# Patient Record
Sex: FEMALE | Race: WHITE | Employment: FULL TIME | URBAN - METROPOLITAN AREA
[De-identification: names, ages, dates, MRNs, and addresses within clinical notes are randomized per-mention and may not be internally consistent; named-entity substitution may affect disease eponyms.]

---

## 2017-01-16 ENCOUNTER — PRE VISIT (OUTPATIENT)
Dept: ORTHOPEDICS | Facility: CLINIC | Age: 47
End: 2017-01-16

## 2017-01-18 ENCOUNTER — OFFICE VISIT (OUTPATIENT)
Dept: ORTHOPEDICS | Facility: CLINIC | Age: 47
End: 2017-01-18

## 2017-01-18 VITALS — HEIGHT: 67 IN | BODY MASS INDEX: 26.37 KG/M2 | WEIGHT: 168 LBS

## 2017-01-18 DIAGNOSIS — M25.562 LEFT KNEE PAIN: Primary | ICD-10-CM

## 2017-01-18 DIAGNOSIS — M25.551 HIP PAIN, RIGHT: ICD-10-CM

## 2017-01-18 DIAGNOSIS — M17.32 POST-TRAUMATIC OSTEOARTHRITIS OF LEFT KNEE: Primary | ICD-10-CM

## 2017-01-18 RX ORDER — PAROXETINE 10 MG/1
TABLET, FILM COATED ORAL
COMMUNITY

## 2017-01-18 ASSESSMENT — ENCOUNTER SYMPTOMS
JOINT SWELLING: 1
ARTHRALGIAS: 1
NECK PAIN: 1
MYALGIAS: 0
MUSCLE WEAKNESS: 0
BACK PAIN: 0
MUSCLE CRAMPS: 0
STIFFNESS: 1

## 2017-01-18 ASSESSMENT — KOOS JR
STANDING UPRIGHT: 2
KOOS JR SCORING: 52.47
GOING UP OR DOWN STAIRS: 4
STRAIGHTENING KNEE FULLY: 2
WHAT AMOUNT OF KNEE PAIN HAVE YOU EXPERIENCED THE LAST WEEK DURING THE FOLLOWING ACTIVITIES: 1
BENDING TO THE FLOOR TO PICK UP OBJECT: 3
TWISING OR PIVOTING ON KNEE: 4
RISING FROM SITTING: 3
HOW SEVERE IS YOUR KNEE STIFFNESS AFTER FIRST WAKING IN MORNING: 1
HOW SEVERE IS YOUR KNEE STIFFNESS AFTER FIRST WAKING IN MORNING: 3
THE FOLLOWING QUESTIONS CONCERN YOUR PHYSICAL FUNCTION. BY THIS WE MEAN YOUR ABILITY TO MOVE AROUND AND TO LOOK AFTER YOURSELF. FOR EACH OF THE FOLLOWING ACTIVITIES PLEASE INDICATE THE DEGREE OF DIFFICULTY YOU HAVE EXPERIENCED IN THE LAST WEEK DUE TO YOUR KNEE.: 1

## 2017-01-18 NOTE — PROGRESS NOTES
[facility], Orthopaedic Surgery, Arthritis, Hip and Knee Replacement    Mariaan Royal MRN# [id number]   Age: 46 year old YOB: 1970     Requesting physician: Nathan Kelley*              History of Present Illness:   Mariana Royal is a 46 year old year old female who presents today for evaluation and management of right hip pain and left knee pain.    Right hip:   This pain started about 1 week ago.  The pain localizes to the lateral aspect of the hip.  She denies trauma.  The pain is present with ambulation.  She does not have pain with sitting, standing, or sleeping.  She does not have pain when walking backwards.  At this point she is using crutches for the bilateral leg pain.  She has tried ibuprofen which helps some.  No increased activity prior to the right hip pain.      Left knee:   She has had left knee pain for about 1 year.  The pain started around last Spring.  She was seen by Dr. Stewart and subsequently underwent left knee arthroscopy in 6/2017 with planned cartilage repair with ACI.  Intraoperatively it was found that she was not a good candidate as the wear was worse than they had suspected.  At that surgery the meniscus and cartilage was debrided.  She has not had any injections.  She did injection on the right knee prior to the UKA in 2009 and did not have much relief.    The right UKA was done in 2009 and she has had a very good result from that surgery.      She works as an elementary .           Past Medical History:   There is no problem list on file for this patient.    Past Medical History   Diagnosis Date     Arthritis      Prior history of blood clot: none  Prior history of bleeding problems: none  Prior history of anesthetic complications: none  Currently on opioids:  none  History of Diabetes: no           Past Surgical History:     Past Surgical History   Procedure Laterality Date     Knee surgery       right x 5     Appendectomy  "      Mouth surgery       Arthroscopy knee Left 6/7/2016     Procedure: ARTHROSCOPY KNEE;  Surgeon: Nathan Stewart MD;  Location:  OR            Social History:     Social History     Social History     Marital Status:      Spouse Name: N/A     Number of Children: N/A     Years of Education: N/A     Occupational History     Not on file.     Social History Main Topics     Smoking status: Never Smoker      Smokeless tobacco: Not on file     Alcohol Use: Yes      Comment: social     Drug Use: No     Sexual Activity: Not on file     Other Topics Concern     Not on file     Social History Narrative     From [place].  Non smoker, elementary .           Family History:       Family History   Problem Relation Age of Onset     Other Cancer Mother      Hypertension Father      Other Cancer Father             Medications:     Current Outpatient Prescriptions   Medication Sig     PARoxetine (PAXIL) 10 MG tablet      omeprazole (PRILOSEC) 20 MG capsule daily      traZODone (DESYREL) 50 MG tablet 50 mg At Bedtime      Probiotic Product (PROBIOTIC DAILY PO) Take by mouth daily      UNABLE TO FIND MEDICATION NAME: Bio Cleanse - OTC     No current facility-administered medications for this visit.                Review of Systems:   A comprehensive 10 point review of systems (constitutional, ENT, cardiac, peripheral vascular, lymphatic, respiratory, GI, , Musculoskeletal, skin, Neurological) was performed and documented in the intake form and at the end of this note.           Physical Exam:     EXAMINATION pertinent findings:   VITAL SIGNS: Height 1.702 m (5' 7\"), weight 76.204 kg (168 lb).  Body mass index is 26.31 kg/(m^2).  GEN: AOx3, appears stated age, cooperative, no distress  HEENT: normocephalic, atraumatic  RESP: non labored breathing   ABD: benign   SKIN: No rashes or open lesions   CV: Non-tachycardic   NEURO: CN2-12 grossly intact   VASCULAR: 2+ DP pulse   MUSCULOSKELETAL:   Gait: " patient walks with a normal gait.   They do achieve full extension at the knee.    Left painful  Knee  Overall limb alignment is: Varus  Effusion or swelling of the knee: none  Tenderness to palpation: yes, pain localizes to the medial joint line  ROM: 0 to 135  Pain with knee ROM: none  Extensor lag: none  MCL stability: Stable corretable to neutral   Lateral Stability: Stable  Lachman: stable  Posterior stability: stable  Pain with passive full hip range of motion: none  Prior surgical incision: none      right painful  Hip  Trendelenberg sign: Negative  Pain with log roll: Negative  Pain with active straight leg raise: Negative  Tenderness to palpation over the bursa: Negative  Tenderness to palpation along the IT band: Negative  pain free passive straight leg raise  ROM  Extension 0, flexion 100, IR 30, ER 50, Abduction 40, Adduction 20  Prior surgical incision: none    No tenderness to palpation of the knee, no pain with knee range of motion.      Motor: normal ehl/fhl/gs/at strength  Sensory: normal sensation in DP/SP/T/S/S distributions  Vascular: 2+ PT pulse           Data:   Imaging:   Plain films show no hip OA, unremarkable pelvis xray.  Alignment films show mild varus alignment and medial compartment OA.           Assessment and Plan:   Assessment:  Ms. Royal is a very pleasant 46-year-old woman who is status post right unicompartmental knee arthroplasty in 2009.  She has had an excellent result from that surgery.      She presents today for evaluation of right hip and left knee pain.  With regards to the right hip, her current symptoms have been going on for about 1 week, and are consistent with trochanteric bursitis.  There are no signs or symptoms consistent with osteoarthritis or other concerning findings.  At this point, I recommended physical therapy for hip abductor strengthening, as well as local modalities.  She was given a referral to physical therapy.      With regards to her left knee, she  has known unicompartmental knee osteoarthritis, status post knee arthroscopy by Dr. Stewart in June.  We discussed operative and non-operative treatment options including corticosteroid injections, medications, and offloading knee brace.  At this point, her symptoms seem to be worsening.  Radiographically, she does not have quite bone-on-bone arthritis, but is very close, and based on Dr. Stewart's arthroscopic notes, it does seem like her arthritis is likely bone-on-bone at some sites.  She was interested in a corticosteroid injection today.  Please see procedure note.  She tolerated this well.  She was also given a referral for an offloading knee brace, which I think would greatly help with her symptoms.      She will return to clinic if she wishes for further treatment, including injections versus further discussion of surgical treatment options.           PROCEDURE DATE: 1/18/2017     PROCEDURE:  We discussed the risks and benefits of injection with the patient.  Informed verbal and writtten consent was obtained.  The patient's left knee was prepped in the normal sterile fashion.   The knee was injected with 9 cc of Lidocaine and 1 cc of Kenalog (40 mg/ml).       The patient tolerated the procedure well.        Israel Vogt M.D.     Arthritis and Joint Replacement  Department of Orthopaedic Surgery, [facility]  [email or web address]  [phone] (pager)          Answers for HPI/ROS submitted by the patient on 1/18/2017   General Symptoms: No  Skin Symptoms: No  HENT Symptoms: No  EYE SYMPTOMS: No  HEART SYMPTOMS: No  LUNG SYMPTOMS: No  INTESTINAL SYMPTOMS: No  URINARY SYMPTOMS: No  GYNECOLOGIC SYMPTOMS: No  BREAST SYMPTOMS: No  SKELETAL SYMPTOMS: Yes  BLOOD SYMPTOMS: No  NERVOUS SYSTEM SYMPTOMS: No  MENTAL HEALTH SYMPTOMS: No  Back pain: No  Muscle aches: No  Neck pain: Yes  Swollen joints: Yes  Joint pain: Yes  Bone pain: No  Muscle cramps: No  Muscle weakness: No  Joint  stiffness: Yes  Bone fracture: No

## 2017-01-18 NOTE — NURSING NOTE
"Reason For Visit:   Chief Complaint   Patient presents with     Consult     Left knee pain and right hip pain. DOS on Left knee cleaned meniscus 6/7/16 By Dr. Stewart       Primary MD: Lauren Yip Np  Ref. MD: Dr. stewart    ?  No    Date of injury: No  Date of surgery: 6/7/16  Type of surgery: Examination Under Anesthesia Left Knee, Left Knee Arthroscopy, Chondroplasty, Partial Meniscectomy  Smoker: No  Request smoking cessation information: No    Ht 1.702 m (5' 7\")  Wt 76.204 kg (168 lb)  BMI 26.31 kg/m2    Pain Assessment  Patient Currently in Pain: No (In pain if she does not use crutches)      Current Outpatient Prescriptions   Medication     PARoxetine (PAXIL) 10 MG tablet     omeprazole (PRILOSEC) 20 MG capsule     traZODone (DESYREL) 50 MG tablet     Probiotic Product (PROBIOTIC DAILY PO)     UNABLE TO FIND     No current facility-administered medications for this visit.        No Known Allergies    "

## 2017-01-18 NOTE — NURSING NOTE
[facility]  [address] [place] [id number]  Dept: [phone]  ______________________________________________________________________________    Patient: Mariana Royal   : 1970   MRN: [id number]   2017    INVASIVE PROCEDURE SAFETY CHECKLIST    Date: 2017   Procedure: Left knee steroid injection for left knee pain.   Patient Name: Mariana Royal  MRN: [id number]  YOB: 1970    Action: Complete sections as appropriate. Any discrepancy results in a HARD COPY until resolved.     PRE PROCEDURE:  Patient ID verified with 2 identifiers (name and  or MRN): Yes  Procedure and site verified with patient/designee (when able): Yes  Accurate consent documentation in medical record: Yes  H&P (or appropriate assessment) documented in medical record: Yes  H&P must be up to 20 days prior to procedure and updates within 24 hours of procedure as applicable: Yes  Relevant diagnostic and radiology test results appropriately labeled and displayed as applicable: Yes  Procedure site(s) marked with provider initials: Yes    TIMEOUT:  Time-Out performed immediately prior to starting procedure, including verbal and active participation of all team members addressing the following:Yes  * Correct patient identify  * Confirmed that the correct side and site are marked  * An accurate procedure consent form  * Agreement on the procedure to be done  * Correct patient position  * Relevant images and results are properly labeled and appropriately displayed  * The need to administer antibiotics or fluids for irrigation purposes during the procedure as applicable   * Safety precautions based on patient history or medication use    DURING PROCEDURE: Verification of correct person, site, and procedures any time the responsibility for care of the patient is transferred to another member of the care team.

## 2017-05-03 ENCOUNTER — OFFICE VISIT (OUTPATIENT)
Dept: ORTHOPEDICS | Facility: CLINIC | Age: 47
End: 2017-05-03

## 2017-05-03 VITALS — BODY MASS INDEX: 25.9 KG/M2 | WEIGHT: 165 LBS | HEIGHT: 67 IN

## 2017-05-03 DIAGNOSIS — M17.12 PRIMARY OSTEOARTHRITIS OF LEFT KNEE: Primary | ICD-10-CM

## 2017-05-03 NOTE — NURSING NOTE
[facility]  [address] [place] [id number]  Dept: [phone]  ______________________________________________________________________________    Patient: Mariana Royal   : 1970   MRN: [id number]   May 3, 2017    INVASIVE PROCEDURE SAFETY CHECKLIST    Date: 5/3/2017   Procedure: Left knee steroid injection for pain  Patient Name: Mariana Royal  MRN: [id number]  YOB: 1970    Action: Complete sections as appropriate. Any discrepancy results in a HARD COPY until resolved.     PRE PROCEDURE:  Patient ID verified with 2 identifiers (name and  or MRN): Yes  Procedure and site verified with patient/designee (when able): Yes  Accurate consent documentation in medical record: Yes  H&P (or appropriate assessment) documented in medical record: Yes  H&P must be up to 20 days prior to procedure and updates within 24 hours of procedure as applicable: Yes  Relevant diagnostic and radiology test results appropriately labeled and displayed as applicable: Yes  Procedure site(s) marked with provider initials: Yes    TIMEOUT:  Time-Out performed immediately prior to starting procedure, including verbal and active participation of all team members addressing the following:Yes  * Correct patient identify  * Confirmed that the correct side and site are marked  * An accurate procedure consent form  * Agreement on the procedure to be done  * Correct patient position  * Relevant images and results are properly labeled and appropriately displayed  * The need to administer antibiotics or fluids for irrigation purposes during the procedure as applicable   * Safety precautions based on patient history or medication use    DURING PROCEDURE: Verification of correct person, site, and procedures any time the responsibility for care of the patient is transferred to another member of the care team.

## 2017-05-03 NOTE — PROGRESS NOTES
[facility] Physicians, Orthopaedic Surgery, Arthritis, Hip and Knee Replacement    Mariana Royal MRN# [id number]   Age: 46 year old YOB: 1970     Requesting physician: Nathan Kelley*              History of Present Illness:   Mariana is a very pleasant 46-year-old woman who is status post a left unicompartmental knee arthroplasty that is functioning very well.  She returns today for evaluation and management of her mild to moderate right knee osteoarthritis.  I had seen her about 4 months ago.  At that point, she had a right knee cortisone injection.  She notes that she had excellent relief of symptoms for at least 4 months.  Over the past few weeks, she noted an acutely increased left knee pain.  The knee pain is severe and debilitating.  It affects her on a daily basis.  It is worse with weightbearing activity.  She describes it as a sharp, shooting pain on the medial aspect of her knee.  She has had intermittent swelling.  She is interested in a repeat injection, given the excellent relief of her symptoms following the last injection.                Past Medical History:   There is no problem list on file for this patient.    Past Medical History:   Diagnosis Date     Arthritis      Prior history of blood clot: none  Prior history of bleeding problems: none  Prior history of anesthetic complications: none  Currently on opioids:  none  History of Diabetes: no           Past Surgical History:     Past Surgical History:   Procedure Laterality Date     APPENDECTOMY       ARTHROSCOPY KNEE Left 6/7/2016    Procedure: ARTHROSCOPY KNEE;  Surgeon: Nathan Stewart MD;  Location:  OR     KNEE SURGERY      right x 5     MOUTH SURGERY              Social History:     Social History     Social History     Marital status:      Spouse name: N/A     Number of children: N/A     Years of education: N/A     Occupational History     Not on file.     Social History Main Topics      "Smoking status: Never Smoker     Smokeless tobacco: Not on file     Alcohol use Yes      Comment: social     Drug use: No     Sexual activity: Not on file     Other Topics Concern     Not on file     Social History Narrative     From [place].  Non smoker, elementary .           Family History:       Family History   Problem Relation Age of Onset     Other Cancer Mother      Hypertension Father      Other Cancer Father             Medications:     Current Outpatient Prescriptions   Medication Sig     PARoxetine (PAXIL) 10 MG tablet      omeprazole (PRILOSEC) 20 MG capsule daily      traZODone (DESYREL) 50 MG tablet 50 mg At Bedtime      Probiotic Product (PROBIOTIC DAILY PO) Take by mouth daily      UNABLE TO FIND MEDICATION NAME: Bio Cleanse - OTC     No current facility-administered medications for this visit.               Physical Exam:     EXAMINATION pertinent findings:   VITAL SIGNS: Height 1.702 m (5' 7\"), weight 74.8 kg (165 lb).  Body mass index is 25.84 kg/(m^2).  GEN: AOx3, appears stated age, cooperative, no distress  HEENT: normocephalic, atraumatic  RESP: non labored breathing   ABD: benign   SKIN: No rashes or open lesions   CV: Non-tachycardic   NEURO: CN2-12 grossly intact   VASCULAR: 2+ DP pulse   MUSCULOSKELETAL:   Gait: patient walks with a normal gait.   They do achieve full extension at the knee.    Left painful  Knee  Overall limb alignment is: Varus  Effusion or swelling of the knee: none  Tenderness to palpation: yes, pain localizes to the medial joint line  ROM: 0 to 135  Pain with knee ROM: none  Extensor lag: none  MCL stability: Stable corretable to neutral   Lateral Stability: Stable  Lachman: stable  Posterior stability: stable  Pain with passive full hip range of motion: none  Prior surgical incision: none    Motor: normal ehl/fhl/gs/at strength  Sensory: normal sensation in DP/SP/T/S/S distributions  Vascular: 2+ PT pulse           Data:   Imaging:   Previously obtained " films show:   Plain films show no hip OA, unremarkable pelvis xray.  Alignment films show mild varus alignment and medial compartment OA.           Assessment and Plan:   Assessment:  Ms. Royal is a very pleasant 46-year-old woman who is status post right unicompartmental knee arthroplasty in 2009.  She has had an excellent result from that surgery.     She returns for ongoing management of left knee mild to moderate osteoarthritis of the medial compartment.  I discussed with her the risks and benefits of repeat injection.  Given the excellent relief from the prior injection, she was interested in proceeding with this.  Please see procedure note.      I discussed with Mariana that if her symptoms improve for another 4-5 months, we would certainly repeat the injection.  If her symptoms are worsening, she will return to the clinic and we can discuss further treatment options, including possible arthroplasty based on the severity of her symptoms and the radiographic appearance at that time.      She was in agreement with this treatment plan, and will follow up on a p.r.n. basis.           PROCEDURE DATE: 5/3/2017     PROCEDURE:  We discussed the risks and benefits of injection with the patient.  Informed verbal and writtten consent was obtained.  The patient's left knee was prepped in the normal sterile fashion.   The knee was injected with 9 cc of Lidocaine and 1 cc of Kenalog (40 mg/ml).       The patient tolerated the procedure well.        Israel Vogt M.D.     Arthritis and Joint Replacement  Department of Orthopaedic Surgery, [facility]  [email or web address]  [phone] (pager)      Answers for HPI/ROS submitted by the patient on 5/3/2017   General Symptoms: No  Skin Symptoms: No  HENT Symptoms: No  EYE SYMPTOMS: No  HEART SYMPTOMS: No  LUNG SYMPTOMS: No  INTESTINAL SYMPTOMS: No  URINARY SYMPTOMS: No  GYNECOLOGIC SYMPTOMS: No  BREAST SYMPTOMS: No  SKELETAL SYMPTOMS: No  BLOOD  SYMPTOMS: No  NERVOUS SYSTEM SYMPTOMS: No  MENTAL HEALTH SYMPTOMS: No

## 2017-05-03 NOTE — MR AVS SNAPSHOT
"              After Visit Summary   5/3/2017    Mariana Royal    MRN: [id number]           Patient Information     Date Of Birth          1970        Visit Information        Provider Department      5/3/2017 12:15 PM Israel Vogt MD [facility]        Today's Diagnoses     Primary osteoarthritis of left knee    -  1       Follow-ups after your visit        Who to contact     Please call your clinic at [phone] to:    Ask questions about your health    Make or cancel appointments    Discuss your medicines    Learn about your test results    Speak to your doctor   If you have compliments or concerns about an experience at your clinic, or if you wish to file a complaint, please contact [facility] Patient Relations at [phone] or email us at [email or web address]         Additional Information About Your Visit        MyChart Information     [employer] is an electronic gateway that provides easy, online access to your medical records. With [employer], you can request a clinic appointment, read your test results, renew a prescription or communicate with your care team.     To sign up for [employer] visit the website at [email or web address]   You will be asked to enter the access code listed below, as well as some personal information. Please follow the directions to create your username and password.     Your access code is: [email or web address]  Expires: 2017 12:13 PM     Your access code will  in 90 days. If you need help or a new code, please contact your [facility] or call [phone] for assistance.        Care EveryWhere ID     This is your Care EveryWhere ID. This could be used by other organizations to access your [facility] medical records  [id number]        Your Vitals Were     Height BMI (Body Mass Index)                1.702 m (5' 7\") 25.84 kg/m2           Blood Pressure from Last 3 Encounters: "   06/16/16 91/61   06/07/16 100/55   02/25/16 97/63    Weight from Last 3 Encounters:   05/03/17 74.8 kg (165 lb)   01/18/17 76.2 kg (168 lb)   06/07/16 77.1 kg (170 lb)              We Performed the Following     Large Joint/Bursa injection and/or drainage - Unilateral (Shoulder, Knee) [20610]        Primary Care Provider Office Phone #    Lauren Yip [phone]       [facility] [address] [facility] [address]        Thank you!     Thank you for choosing [facility]  for your care. Our goal is always to provide you with excellent care. Hearing back from our patients is one way we can continue to improve our services. Please take a few minutes to complete the written survey that you may receive in the mail after your visit with us. Thank you!             Your Updated Medication List - Protect others around you: Learn how to safely use, store and throw away your medicines at www.disposemymeds.org.          This list is accurate as of: 5/3/17 11:59 PM.  Always use your most recent med list.                   Brand Name Dispense Instructions for use    omeprazole 20 MG CR capsule    priLOSEC     daily       PAXIL 10 MG tablet   Generic drug:  PARoxetine          PROBIOTIC DAILY PO      Take by mouth daily       traZODone 50 MG tablet    DESYREL     50 mg At Bedtime       UNABLE TO FIND      MEDICATION NAME: Bio Cleanse - OTC

## 2017-05-03 NOTE — LETTER
5/3/2017       RE: Mariana Royal  [address] [place] [place] [address]     Dear Colleague,    Thank you for referring your patient, Mariana Royal, to the [facility] at [facility]. Please see a copy of my visit note below.    [facility], Orthopaedic Surgery, Arthritis, Hip and Knee Replacement    Mariana Royal MRN# [id number]   Age: 46 year old YOB: 1970     Requesting physician: Nathan Kelley*              History of Present Illness:   Mariana is a very pleasant 46-year-old woman who is status post a left unicompartmental knee arthroplasty that is functioning very well.  She returns today for evaluation and management of her mild to moderate right knee osteoarthritis.  I had seen her about 4 months ago.  At that point, she had a right knee cortisone injection.  She notes that she had excellent relief of symptoms for at least 4 months.  Over the past few weeks, she noted an acutely increased left knee pain.  The knee pain is severe and debilitating.  It affects her on a daily basis.  It is worse with weightbearing activity.  She describes it as a sharp, shooting pain on the medial aspect of her knee.  She has had intermittent swelling.  She is interested in a repeat injection, given the excellent relief of her symptoms following the last injection.                Past Medical History:   There is no problem list on file for this patient.    Past Medical History:   Diagnosis Date     Arthritis      Prior history of blood clot: none  Prior history of bleeding problems: none  Prior history of anesthetic complications: none  Currently on opioids:  none  History of Diabetes: no           Past Surgical History:     Past Surgical History:   Procedure Laterality Date     APPENDECTOMY       ARTHROSCOPY KNEE Left 6/7/2016    Procedure: ARTHROSCOPY KNEE;  Surgeon: Nathan Stewart MD;  Location:  OR  "    KNEE SURGERY      right x 5     MOUTH SURGERY              Social History:     Social History     Social History     Marital status:      Spouse name: N/A     Number of children: N/A     Years of education: N/A     Occupational History     Not on file.     Social History Main Topics     Smoking status: Never Smoker     Smokeless tobacco: Not on file     Alcohol use Yes      Comment: social     Drug use: No     Sexual activity: Not on file     Other Topics Concern     Not on file     Social History Narrative     From [place].  Non smoker, elementary .           Family History:       Family History   Problem Relation Age of Onset     Other Cancer Mother      Hypertension Father      Other Cancer Father             Medications:     Current Outpatient Prescriptions   Medication Sig     PARoxetine (PAXIL) 10 MG tablet      omeprazole (PRILOSEC) 20 MG capsule daily      traZODone (DESYREL) 50 MG tablet 50 mg At Bedtime      Probiotic Product (PROBIOTIC DAILY PO) Take by mouth daily      UNABLE TO FIND MEDICATION NAME: Bio Cleanse - OTC     No current facility-administered medications for this visit.               Physical Exam:     EXAMINATION pertinent findings:   VITAL SIGNS: Height 1.702 m (5' 7\"), weight 74.8 kg (165 lb).  Body mass index is 25.84 kg/(m^2).  GEN: AOx3, appears stated age, cooperative, no distress  HEENT: normocephalic, atraumatic  RESP: non labored breathing   ABD: benign   SKIN: No rashes or open lesions   CV: Non-tachycardic   NEURO: CN2-12 grossly intact   VASCULAR: 2+ DP pulse   MUSCULOSKELETAL:   Gait: patient walks with a normal gait.   They do achieve full extension at the knee.    Left painful  Knee  Overall limb alignment is: Varus  Effusion or swelling of the knee: none  Tenderness to palpation: yes, pain localizes to the medial joint line  ROM: 0 to 135  Pain with knee ROM: none  Extensor lag: none  MCL stability: Stable corretable to neutral   Lateral Stability: " Stable  Lachman: stable  Posterior stability: stable  Pain with passive full hip range of motion: none  Prior surgical incision: none    Motor: normal ehl/fhl/gs/at strength  Sensory: normal sensation in DP/SP/T/S/S distributions  Vascular: 2+ PT pulse           Data:   Imaging:   Previously obtained films show:   Plain films show no hip OA, unremarkable pelvis xray.  Alignment films show mild varus alignment and medial compartment OA.           Assessment and Plan:   Assessment:  Ms. Royal is a very pleasant 46-year-old woman who is status post right unicompartmental knee arthroplasty in 2009.  She has had an excellent result from that surgery.     She returns for ongoing management of left knee mild to moderate osteoarthritis of the medial compartment.  I discussed with her the risks and benefits of repeat injection.  Given the excellent relief from the prior injection, she was interested in proceeding with this.  Please see procedure note.      I discussed with Mariana that if her symptoms improve for another 4-5 months, we would certainly repeat the injection.  If her symptoms are worsening, she will return to the clinic and we can discuss further treatment options, including possible arthroplasty based on the severity of her symptoms and the radiographic appearance at that time.      She was in agreement with this treatment plan, and will follow up on a p.r.n. basis.           PROCEDURE DATE: 5/3/2017     PROCEDURE:  We discussed the risks and benefits of injection with the patient.  Informed verbal and writtten consent was obtained.  The patient's left knee was prepped in the normal sterile fashion.   The knee was injected with 9 cc of Lidocaine and 1 cc of Kenalog (40 mg/ml).       The patient tolerated the procedure well.        Israel Vogt M.D.     Arthritis and Joint Replacement  Department of Orthopaedic Surgery, [facility]  [email or web address]  [phone]  (pager)

## 2017-05-03 NOTE — NURSING NOTE
"Reason For Visit:   Chief Complaint   Patient presents with     RECHECK     F/U left knee pain. Requesting injection       Primary MD: Lauren Yip Np      Ht 1.702 m (5' 7\")  Wt 74.8 kg (165 lb)  BMI 25.84 kg/m2    Pain Assessment  Patient Currently in Pain: Yes  0-10 Pain Scale: 6  Primary Pain Location: Knee  Pain Orientation: Left  Pain Descriptors: Burning      Current Outpatient Prescriptions   Medication     PARoxetine (PAXIL) 10 MG tablet     omeprazole (PRILOSEC) 20 MG capsule     traZODone (DESYREL) 50 MG tablet     Probiotic Product (PROBIOTIC DAILY PO)     UNABLE TO FIND     No current facility-administered medications for this visit.         No Known Allergies  "

## 2017-05-04 RX ORDER — LIDOCAINE HYDROCHLORIDE 10 MG/ML
8 INJECTION, SOLUTION INFILTRATION; PERINEURAL ONCE
Status: DISCONTINUED | OUTPATIENT
Start: 2017-05-04 | End: 2017-10-06

## 2017-05-04 RX ORDER — TRIAMCINOLONE ACETONIDE 40 MG/ML
40 INJECTION, SUSPENSION INTRA-ARTICULAR; INTRAMUSCULAR ONCE
Status: ACTIVE | OUTPATIENT
Start: 2017-05-04

## 2017-06-05 DIAGNOSIS — Z53.9 ERRONEOUS ENCOUNTER--DISREGARD: Primary | ICD-10-CM

## 2017-06-07 DIAGNOSIS — M19.90 OSTEOARTHRITIS: Primary | ICD-10-CM

## 2017-06-07 DIAGNOSIS — M17.11 PRIMARY OSTEOARTHRITIS OF RIGHT KNEE: ICD-10-CM

## 2017-06-14 ENCOUNTER — OFFICE VISIT (OUTPATIENT)
Dept: ORTHOPEDICS | Facility: CLINIC | Age: 47
End: 2017-06-14

## 2017-06-14 VITALS — WEIGHT: 167.1 LBS | HEIGHT: 68 IN | BODY MASS INDEX: 25.33 KG/M2

## 2017-06-14 DIAGNOSIS — G89.29 CHRONIC PAIN OF LEFT KNEE: Primary | ICD-10-CM

## 2017-06-14 DIAGNOSIS — M25.562 CHRONIC PAIN OF LEFT KNEE: Primary | ICD-10-CM

## 2017-06-14 NOTE — NURSING NOTE
"Reason For Visit:   Chief Complaint   Patient presents with     RECHECK     F/U Left knee. Would like injection. Would like to talk about options       Primary MD: Lauren Yip Np        Ht 1.718 m (5' 7.64\")  Wt 75.8 kg (167 lb 1.6 oz)  BMI 25.68 kg/m2    Pain Assessment  Patient Currently in Pain: Yes  0-10 Pain Scale: 4  Primary Pain Location: Knee  Pain Orientation: Left  Pain Descriptors: Burning  Alleviating Factors: Rest  Aggravating Factors: Walking, Stairs, Movement           Current Outpatient Prescriptions   Medication     PARoxetine (PAXIL) 10 MG tablet     omeprazole (PRILOSEC) 20 MG capsule     traZODone (DESYREL) 50 MG tablet     Probiotic Product (PROBIOTIC DAILY PO)     UNABLE TO FIND     Current Facility-Administered Medications   Medication     lidocaine 1 % injection 8 mL     triamcinolone acetonide (KENALOG-40) injection 40 mg        No Known Allergies  "

## 2017-06-14 NOTE — MR AVS SNAPSHOT
"              After Visit Summary   2017    Mariana Royal    MRN: [id number]           Patient Information     Date Of Birth          1970        Visit Information        Provider Department      2017 2:45 PM Israel Vogt MD [facility]        Today's Diagnoses     Chronic pain of left knee    -  1       Follow-ups after your visit        Who to contact     Please call your clinic at [phone] to:    Ask questions about your health    Make or cancel appointments    Discuss your medicines    Learn about your test results    Speak to your doctor   If you have compliments or concerns about an experience at your clinic, or if you wish to file a complaint, please contact [facility] Patient Relations at [phone] or email us at [email or web address]         Additional Information About Your Visit        MyChart Information     [employer] is an electronic gateway that provides easy, online access to your medical records. With [employer], you can request a clinic appointment, read your test results, renew a prescription or communicate with your care team.     To sign up for [employer] visit the website at [email or web address]   You will be asked to enter the access code listed below, as well as some personal information. Please follow the directions to create your username and password.     Your access code is: [email or web address]  Expires: 2017 12:13 PM     Your access code will  in 90 days. If you need help or a new code, please contact your [facility] or call [phone] for assistance.        Care EveryWhere ID     This is your Care EveryWhere ID. This could be used by other organizations to access your [facility] medical records  [id number]        Your Vitals Were     Height BMI (Body Mass Index)                1.718 m (5' 7.64\") 25.68 kg/m2           Blood Pressure from Last 3 Encounters: "   06/16/16 91/61   06/07/16 100/55   02/25/16 97/63    Weight from Last 3 Encounters:   06/14/17 75.8 kg (167 lb 1.6 oz)   05/03/17 74.8 kg (165 lb)   01/18/17 76.2 kg (168 lb)               Primary Care Provider Office Phone # Fax #    Lauren JAIN Quincy Theodora, QUINCY [phone]       [facility] [address] [facility] [address]        Equal Access to Services     AMBER TRIPP : Hadii aad ku hadasho Soomaali, waaxda luqadaha, qaybta kaalmada adeegyada, waxsourav horvath hayomar gonzalez . So [facility] [phone].    ATENCIÓN: Si habla español, tiene a harrison disposición servicios gratuitos de asistencia lingüística. Llame al [phone].    We comply with applicable federal civil rights laws and [place] laws. We do not discriminate on the basis of race, color, national origin, age, disability sex, sexual orientation or gender identity.            Thank you!     Thank you for choosing [facility]  for your care. Our goal is always to provide you with excellent care. Hearing back from our patients is one way we can continue to improve our services. Please take a few minutes to complete the written survey that you may receive in the mail after your visit with us. Thank you!             Your Updated Medication List - Protect others around you: Learn how to safely use, store and throw away your medicines at www.disposemymeds.org.          This list is accurate as of: 6/14/17 11:59 PM.  Always use your most recent med list.                   Brand Name Dispense Instructions for use Diagnosis    omeprazole 20 MG CR capsule    priLOSEC     daily        PAXIL 10 MG tablet   Generic drug:  PARoxetine           PROBIOTIC DAILY PO      Take by mouth daily        traZODone 50 MG tablet    DESYREL     50 mg At Bedtime        UNABLE TO FIND      MEDICATION NAME: Bio Cleanse - OTC

## 2017-06-14 NOTE — LETTER
6/14/2017       RE: Mariana Royal  [address] [place] [address]     Dear Colleague,    Thank you for referring your patient, Mariana Royal, to the [facility] at [facility]. Please see a copy of my visit note below.    [facility], Orthopaedic Surgery, Arthritis, Hip and Knee Replacement    Mariana Royal MRN# [id number]   Age: 46 year old YOB: 1970     Requesting physician: Nathan Kelley*              History of Present Illness:   Mariana returns today for evaluation and management of her left knee osteoarthritis.  At her last visit, we performed a corticosteroid injection.  She notes that the injection did not provide durable pain relief as the other injections had.  She notes that over the past few weeks her left knee has gotten progressively painful.  For the most part, she localizes the pain to the lateral side.  She notes that the knee is frequently swollen.  At this point, she is ambulating with the use of bilateral crutches due to the right significant knee pain.  Again, she has had multiple injections in the past.  She has not yet tried an offloading knee brace.  She has done extensive physical therapy for strengthening and stretching and is taking anti-inflammatory medications.            Past Medical History:   There is no problem list on file for this patient.    Past Medical History:   Diagnosis Date     Arthritis      Prior history of blood clot: none  Prior history of bleeding problems: none  Prior history of anesthetic complications: none  Currently on opioids:  none  History of Diabetes: no           Past Surgical History:     Past Surgical History:   Procedure Laterality Date     APPENDECTOMY       ARTHROSCOPY KNEE Left 6/7/2016    Procedure: ARTHROSCOPY KNEE;  Surgeon: Nathan Stewart MD;  Location: [facility] OR     KNEE SURGERY      right x 5     MOUTH SURGERY              " Social History:     Social History     Social History     Marital status:      Spouse name: N/A     Number of children: N/A     Years of education: N/A     Occupational History     Not on file.     Social History Main Topics     Smoking status: Never Smoker     Smokeless tobacco: Not on file     Alcohol use Yes      Comment: social     Drug use: No     Sexual activity: Not on file     Other Topics Concern     Not on file     Social History Narrative     From [place].  Non smoker, elementary .           Family History:       Family History   Problem Relation Age of Onset     Other Cancer Mother      Hypertension Father      Other Cancer Father             Medications:     Current Outpatient Prescriptions   Medication Sig     PARoxetine (PAXIL) 10 MG tablet      omeprazole (PRILOSEC) 20 MG capsule daily      traZODone (DESYREL) 50 MG tablet 50 mg At Bedtime      Probiotic Product (PROBIOTIC DAILY PO) Take by mouth daily      UNABLE TO FIND MEDICATION NAME: Bio Cleanse - OTC     Current Facility-Administered Medications   Medication     lidocaine 1 % injection 8 mL     triamcinolone acetonide (KENALOG-40) injection 40 mg              Physical Exam:     EXAMINATION pertinent findings:   VITAL SIGNS: Height 1.718 m (5' 7.64\"), weight 75.8 kg (167 lb 1.6 oz).  Body mass index is 25.68 kg/(m^2).  GEN: AOx3, appears stated age, cooperative, no distress  HEENT: normocephalic, atraumatic  RESP: non labored breathing   ABD: benign   SKIN: No rashes or open lesions   CV: Non-tachycardic   NEURO: CN2-12 grossly intact   VASCULAR: 2+ DP pulse   MUSCULOSKELETAL:   Gait: patient walks with a normal gait.   They do achieve full extension at the knee.    Left painful  Knee  Overall limb alignment is: Varus  Effusion or swelling of the knee: none  Tenderness to palpation: yes, pain localizes to the medial joint line  ROM: 0 to 135  Pain with knee ROM: none  Extensor lag: none  MCL stability: Stable corretable to " neutral   Lateral Stability: Stable  Lachman: stable  Posterior stability: stable  Pain with passive full hip range of motion: none  Prior surgical incision: none    Motor: normal ehl/fhl/gs/at strength  Sensory: normal sensation in DP/SP/T/S/S distributions  Vascular: 2+ PT pulse           Data:   Imaging:   New imaging was obtained which again demonstrates the mild to moderate medial compartment osteoarthritis.  Again, there is no evidence of lateral or patellofemoral joint osteoarthritis.  There does appear to be a small amount of cartilage left.  However, in reviewing the prior arthroscopic images, there are areas of full-thickness cartilage loss.                Assessment and Plan:   Assessment:  Mariana is a very pleasant 46-year-old woman who I have follow now for many months. She had a prior Right partial knee replacement. She is doing very well following that.  I have been following her for ongoing media compartment arthritis Of the left knee.  Based on her radiographs she does appear to have some preservation of her cartilage Both on the AP and D bent knee view.  Nonetheless, she has significant and debilitating pain. At this point she is using crutches for weight-bearing due to the left knee pain.  We have tried Cortisone and vicsosupplementation injections With diminishing release of her symptoms. Initially she had excellent pain relief.  She localizes all of her symptoms to the media compartment of the left knee.  She denies lateral or anterior knee pain.  I recommended we obtain an MRI to better evaluate the cartilage. She has had prior knee scope That demonstrated full thickness cartilage loss by Dr. Stewart.  I would expect the MRI to demonstrate full thickness cartilage loss with bone on bone Osteoarthritis. If that is the case, we would likely consider partial replacement.  Given her young age we discuss that she is at increased risk for revision Or conversion to total knee replacement.  She is  aware of these risks and had good results falling the right near placement. We discuss that.  Despite that there is the possibility of having complications such as infection, or joint problems, persistent pain, Or other issues related to the partial knee replacement.  Given the severe pain, she is interested in considering this as a treatment option. We will obtain The MRI and contact her with the results and further discuss management options at that point.    Again, thank you for allowing me to participate in the care of your patient.      Sincerely,    Israel Vogt MD

## 2017-06-14 NOTE — PROGRESS NOTES
[facility], Orthopaedic Surgery, Arthritis, Hip and Knee Replacement    Marinaa Royal MRN# [id number]   Age: 46 year old YOB: 1970     Requesting physician: Nathan Kelley*              History of Present Illness:   Mariana returns today for evaluation and management of her left knee osteoarthritis.  At her last visit, we performed a corticosteroid injection.  She notes that the injection did not provide durable pain relief as the other injections had.  She notes that over the past few weeks her left knee has gotten progressively painful.  For the most part, she localizes the pain to the lateral side.  She notes that the knee is frequently swollen.  At this point, she is ambulating with the use of bilateral crutches due to the right significant knee pain.  Again, she has had multiple injections in the past.  She has not yet tried an offloading knee brace.  She has done extensive physical therapy for strengthening and stretching and is taking anti-inflammatory medications.                  Past Medical History:   There is no problem list on file for this patient.    Past Medical History:   Diagnosis Date     Arthritis      Prior history of blood clot: none  Prior history of bleeding problems: none  Prior history of anesthetic complications: none  Currently on opioids:  none  History of Diabetes: no           Past Surgical History:     Past Surgical History:   Procedure Laterality Date     APPENDECTOMY       ARTHROSCOPY KNEE Left 6/7/2016    Procedure: ARTHROSCOPY KNEE;  Surgeon: Nathan Stewart MD;  Location:  OR     KNEE SURGERY      right x 5     MOUTH SURGERY              Social History:     Social History     Social History     Marital status:      Spouse name: N/A     Number of children: N/A     Years of education: N/A     Occupational History     Not on file.     Social History Main Topics     Smoking status: Never Smoker     Smokeless tobacco: Not on  "file     Alcohol use Yes      Comment: social     Drug use: No     Sexual activity: Not on file     Other Topics Concern     Not on file     Social History Narrative     From [place].  Non smoker, elementary .           Family History:       Family History   Problem Relation Age of Onset     Other Cancer Mother      Hypertension Father      Other Cancer Father             Medications:     Current Outpatient Prescriptions   Medication Sig     PARoxetine (PAXIL) 10 MG tablet      omeprazole (PRILOSEC) 20 MG capsule daily      traZODone (DESYREL) 50 MG tablet 50 mg At Bedtime      Probiotic Product (PROBIOTIC DAILY PO) Take by mouth daily      UNABLE TO FIND MEDICATION NAME: Bio Cleanse - OTC     Current Facility-Administered Medications   Medication     lidocaine 1 % injection 8 mL     triamcinolone acetonide (KENALOG-40) injection 40 mg              Physical Exam:     EXAMINATION pertinent findings:   VITAL SIGNS: Height 1.718 m (5' 7.64\"), weight 75.8 kg (167 lb 1.6 oz).  Body mass index is 25.68 kg/(m^2).  GEN: AOx3, appears stated age, cooperative, no distress  HEENT: normocephalic, atraumatic  RESP: non labored breathing   ABD: benign   SKIN: No rashes or open lesions   CV: Non-tachycardic   NEURO: CN2-12 grossly intact   VASCULAR: 2+ DP pulse   MUSCULOSKELETAL:   Gait: patient walks with a normal gait.   They do achieve full extension at the knee.    Left painful  Knee  Overall limb alignment is: Varus  Effusion or swelling of the knee: none  Tenderness to palpation: yes, pain localizes to the medial joint line  ROM: 0 to 135  Pain with knee ROM: none  Extensor lag: none  MCL stability: Stable corretable to neutral   Lateral Stability: Stable  Lachman: stable  Posterior stability: stable  Pain with passive full hip range of motion: none  Prior surgical incision: none    Motor: normal ehl/fhl/gs/at strength  Sensory: normal sensation in DP/SP/T/S/S distributions  Vascular: 2+ PT pulse           " Data:   Imaging:   New imaging was obtained which again demonstrates the mild to moderate medial compartment osteoarthritis.  Again, there is no evidence of lateral or patellofemoral joint osteoarthritis.  There does appear to be a small amount of cartilage left.  However, in reviewing the prior arthroscopic images, there are areas of full-thickness cartilage loss.                Assessment and Plan:   Assessment:  Mariana is a very pleasant 46-year-old woman who I have follow now for many months. She had a prior Right partial knee replacement. She is doing very well following that.  I have been following her for ongoing media compartment arthritis Of the left knee.  Based on her radiographs she does appear to have some preservation of her cartilage Both on the AP and D bent knee view.  Nonetheless, she has significant and debilitating pain. At this point she is using crutches for weight-bearing due to the left knee pain.  We have tried Cortisone and vicsosupplementation injections With diminishing release of her symptoms. Initially she had excellent pain relief.  She localizes all of her symptoms to the media compartment of the left knee.  She denies lateral or anterior knee pain.  I recommended we obtain an MRI to better evaluate the cartilage. She has had prior knee scope That demonstrated full thickness cartilage loss by Dr. Stewart.  I would expect the MRI to demonstrate full thickness cartilage loss with bone on bone Osteoarthritis. If that is the case, we would likely consider partial replacement.  Given her young age we discuss that she is at increased risk for revision Or conversion to total knee replacement.  She is aware of these risks and had good results falling the right near placement. We discuss that.  Despite that there is the possibility of having complications such as infection, or joint problems, persistent pain, Or other issues related to the partial knee replacement.  Given the severe pain,  she is interested in considering this as a treatment option. We will obtain The MRI and contact her with the results and further discuss management options at that point.      Israel Vogt M.D.     Arthritis and Joint Replacement  Department of Orthopaedic Surgery, [facility]  [email or web address]  [phone] (pager)    Answers for HPI/ROS submitted by the patient on 6/14/2017   General Symptoms: No  Skin Symptoms: No  HENT Symptoms: No  EYE SYMPTOMS: No  HEART SYMPTOMS: No  LUNG SYMPTOMS: No  INTESTINAL SYMPTOMS: No  URINARY SYMPTOMS: No  GYNECOLOGIC SYMPTOMS: No  BREAST SYMPTOMS: No  SKELETAL SYMPTOMS: No  BLOOD SYMPTOMS: No  NERVOUS SYSTEM SYMPTOMS: No  MENTAL HEALTH SYMPTOMS: No

## 2017-07-10 ENCOUNTER — TRANSFERRED RECORDS (OUTPATIENT)
Dept: HEALTH INFORMATION MANAGEMENT | Facility: CLINIC | Age: 47
End: 2017-07-10

## 2017-07-20 ENCOUNTER — TELEPHONE (OUTPATIENT)
Dept: ORTHOPEDICS | Facility: CLINIC | Age: 47
End: 2017-07-20

## 2017-07-20 NOTE — TELEPHONE ENCOUNTER
[facility]:  Nursing Note  SITUATION                                                      Mariana Royal is a 46 year old female who calls with questions about results of knee MRI, ronnell Tanner RN with Dr. Vogt to call back with results and plan asap.        PLAN                                                        If further questions/concerns or if symptoms do not improve, worsen or new symptoms develop, patient/family are advised to call [phone], option #3 to speak with a triage nurse, as soon as possible.    Eve Forbes

## 2017-08-02 ENCOUNTER — TELEPHONE (OUTPATIENT)
Dept: ORTHOPEDICS | Facility: CLINIC | Age: 47
End: 2017-08-02

## 2017-08-02 NOTE — TELEPHONE ENCOUNTER
Pt. Is calling asking for Dr. Vogt to call her back today with the results of the Left Knee MRI that was done 07/10/2017. She can be reached at [phone]

## 2017-09-06 ENCOUNTER — OFFICE VISIT (OUTPATIENT)
Dept: ORTHOPEDICS | Facility: CLINIC | Age: 47
End: 2017-09-06

## 2017-09-06 VITALS — HEIGHT: 67 IN | BODY MASS INDEX: 25.9 KG/M2 | WEIGHT: 165 LBS

## 2017-09-06 DIAGNOSIS — M17.12 PRIMARY OSTEOARTHRITIS OF LEFT KNEE: Primary | ICD-10-CM

## 2017-09-06 ASSESSMENT — ENCOUNTER SYMPTOMS
MYALGIAS: 1
MUSCLE WEAKNESS: 0
ARTHRALGIAS: 1
NECK PAIN: 1
JOINT SWELLING: 1
BACK PAIN: 0
STIFFNESS: 1
MUSCLE CRAMPS: 0

## 2017-09-06 NOTE — LETTER
9/6/2017       RE: Mariana Royal  [address] [place] [address]     Dear Colleague,    Thank you for referring your patient, Mariaan Royal, to the [facility] at [facility]. Please see a copy of my visit note below.    [facility], Orthopaedic Surgery, Arthritis, Hip and Knee Replacement    Mariana Royal MRN# [id number]   Age: 46 year old YOB: 1970     Requesting physician: Nathan Kelley*              History of Present Illness:   Mariana returns for follow-up of her left medial compartment arthritis. Since her last visit she has had severe and progressive pain. She is unable to do normal activities of daily living such as walk with her family. She is also a  in [employer] and she has difficulty doing her job. At this point she has been using crutches to offload the knee due to the severe pain and swelling. She had previously had multiple injections, which provide good temporary relief of her symptoms. She is quite frustrated by the limitations of her activities of daily living and is interested in proceeding with partial knee replacement at this point. Otherwise, there have been no changes to her symptoms or medical history.               Past Medical History:   There is no problem list on file for this patient.    Past Medical History:   Diagnosis Date     Arthritis      Prior history of blood clot: none  Prior history of bleeding problems: none  Prior history of anesthetic complications: none  Currently on opioids:  none  History of Diabetes: no           Past Surgical History:     Past Surgical History:   Procedure Laterality Date     APPENDECTOMY       ARTHROSCOPY KNEE Left 6/7/2016    Procedure: ARTHROSCOPY KNEE;  Surgeon: Nathan Stewart MD;  Location: [facility] OR     KNEE SURGERY      right x 5     MOUTH SURGERY              Social History:     Social History  "    Social History     Marital status:      Spouse name: N/A     Number of children: N/A     Years of education: N/A     Occupational History     Not on file.     Social History Main Topics     Smoking status: Never Smoker     Smokeless tobacco: Not on file     Alcohol use Yes      Comment: social     Drug use: No     Sexual activity: Not on file     Other Topics Concern     Not on file     Social History Narrative     From [place].  Non smoker, elementary .           Family History:       Family History   Problem Relation Age of Onset     Other Cancer Mother      Hypertension Father      Other Cancer Father             Medications:     Current Outpatient Prescriptions   Medication Sig     PARoxetine (PAXIL) 10 MG tablet      omeprazole (PRILOSEC) 20 MG capsule daily      traZODone (DESYREL) 50 MG tablet 50 mg At Bedtime      Probiotic Product (PROBIOTIC DAILY PO) Take by mouth daily      UNABLE TO FIND MEDICATION NAME: Bio Cleanse - OTC     Current Facility-Administered Medications   Medication     lidocaine 1 % injection 8 mL     triamcinolone acetonide (KENALOG-40) injection 40 mg              Physical Exam:     EXAMINATION pertinent findings:   VITAL SIGNS: Height 1.702 m (5' 7\"), weight 74.8 kg (165 lb).  Body mass index is 25.84 kg/(m^2).  GEN: AOx3, appears stated age, cooperative, no distress  HEENT: normocephalic, atraumatic  RESP: non labored breathing   ABD: benign   SKIN: No rashes or open lesions   CV: Non-tachycardic   NEURO: CN2-12 grossly intact   VASCULAR: 2+ DP pulse   MUSCULOSKELETAL:   Gait: patient walks with a normal gait.   They do achieve full extension at the knee.    Left painful  Knee  Overall limb alignment is: Varus  Effusion or swelling of the knee: none  Tenderness to palpation: yes, pain localizes to the medial joint line  ROM: 0 to 135  Pain with knee ROM: none  Extensor lag: none  MCL stability: Stable corretable to neutral   Lateral Stability: Stable  Lachman: " stable  Posterior stability: stable  Pain with passive full hip range of motion: none  Prior surgical incision: none    Motor: normal ehl/fhl/gs/at strength  Sensory: normal sensation in DP/SP/T/S/S distributions  Vascular: 2+ PT pulse           Data:   Imaging:   New imaging was obtained which again demonstrates the mild to moderate medial compartment osteoarthritis.  Again, there is no evidence of lateral or patellofemoral joint osteoarthritis.  There does appear to be a small amount of cartilage left.      However, in reviewing the prior arthroscopic images, there are areas of full-thickness cartilage loss.                Assessment and Plan:   Assessment:  Mariana is a very pleasant 47-year-old woman who had been following for many months for her left knee medial compartment osteoarthritis. She has failed prior treatments including arthroscopic surgery, multiple injections, which provided good temporary relief of her symptoms, medications, activity modifications, gait aids. She has severe debilitating pain that is limiting her normal activities of daily living. 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 She had a prior partial knee replacement done many years ago and has an excellent result following that. We discussed the risks and benefits of partial versus total knee replacement. Given that her symptoms are all medial based and her imaging is very reassuring as was her arthroscopic images for lack of arthritis in the patellofemoral or lateral compartment, she is interested in proceeding with partial knee replacement. We specifically reviewed the side higher risk of revision to total knee in the setting of partial knee replacement. We also reviewed the risks of implant loosening, progression of lateral or patellofemoral arthritis, infection, wound healing problems, knee stiffness, nerve injury, blood loss, and medical complications. Following that discussion she expressed interest  in proceeding with left partial knee replacement. We will work on scheduling surgery at a time that works well for her.    Israel Vogt M.D.     Arthritis and Joint Replacement  Department of Orthopaedic Surgery, [facility]  [email or web address]  [phone] (pager)

## 2017-09-06 NOTE — NURSING NOTE
Teaching Flowsheet   Relevant Diagnosis: osteoarthritis  Teaching Topic: preop left uni knee arthroplasty    Pt lives near 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     Person(s) involved in teaching:   Patient     Motivation Level:  Asks Questions: Yes  Eager to Learn: Yes  Cooperative: Yes  Receptive (willing/able to accept information): Yes  Any cultural factors/Episcopal beliefs that may influence understanding or compliance? No  Comments:      Patient demonstrates understanding of the following:  Reason for the appointment, diagnosis and treatment plan: Yes  Knowledge of proper use of medications and conditions for which they are ordered (with special attention to potential side effects or drug interactions): Yes  Which situations necessitate calling provider and whom to contact: Yes       Teaching Concerns Addressed:   Comments:      Proper use and care of ambulatory devices (medical equip, care aids, etc.): Yes  Nutritional needs and diet plan: Yes  Pain management techniques: Yes  Wound Care: Yes  How and/when to access community resources: Yes     Instructional Materials Used/Given: preop packet, total joint class trifold, antiseptic soap,  antibiotics before dental reminder card,        Time spent with patient: 15 minutes.

## 2017-09-06 NOTE — MR AVS SNAPSHOT
After Visit Summary   2017    Mariana Royal    MRN: [id number]           Patient Information     Date Of Birth          1970        Visit Information        Provider Department      2017 4:45 PM Israel Vogt MD [facility]        Today's Diagnoses     Primary osteoarthritis of left knee    -  1       Follow-ups after your visit        Your next 10 appointments already scheduled     Oct 10, 2017   Procedure with Israel Vogt MD   [facility], [place], Same Day Surgery (--)    [address] [address] [phone]              Who to contact     Please call your clinic at [phone] to:    Ask questions about your health    Make or cancel appointments    Discuss your medicines    Learn about your test results    Speak to your doctor   If you have compliments or concerns about an experience at your clinic, or if you wish to file a complaint, please contact [facility] Patient Relations at [phone] or email us at [email or web address]         Additional Information About Your Visit        MyChart Information     [employer] is an electronic gateway that provides easy, online access to your medical records. With [employer], you can request a clinic appointment, read your test results, renew a prescription or communicate with your care team.     To sign up for [employer] visit the website at [email or web address]   You will be asked to enter the access code listed below, as well as some personal information. Please follow the directions to create your username and password.     Your access code is: [email or web address]  Expires: 2017  4:29 PM     Your access code will  in 90 days. If you need help or a new code, please contact your [facility] or call [phone] for assistance.        Care EveryWhere ID     This is your Care EveryWhere ID. This could be used by other  "organizations to access your [place] medical records  [id number]        Your Vitals Were     Height BMI (Body Mass Index)                1.702 m (5' 7\") 25.84 kg/m2           Blood Pressure from Last 3 Encounters:   06/16/16 91/61   06/07/16 100/55   02/25/16 97/63    Weight from Last 3 Encounters:   09/06/17 74.8 kg (165 lb)   06/14/17 75.8 kg (167 lb 1.6 oz)   05/03/17 74.8 kg (165 lb)              We Performed the Following     [place]-Operative Worksheet        Primary Care Provider Office Phone # Fax #    Lauren CRISTOBAL Yip, QU[place] [phone]       [facility] [address] [facility] [address]        Equal Access to Services     AMBER TRIPP : Hadii aad ku hadasho Soomaali, waaxda luqadaha, qaybta kaalmada adeegyada, mónica gonzalez . So [facility] [phone].    ATENCIÓN: Si habla español, tiene a harrison disposición servicios gratuitos de asistencia lingüística. Shemar al [phone].    We comply with applicable federal civil rights laws and [place] laws. We do not discriminate on the basis of race, color, national origin, age, disability sex, sexual orientation or gender identity.            Thank you!     Thank you for choosing [facility]  for your care. Our goal is always to provide you with excellent care. Hearing back from our patients is one way we can continue to improve our services. Please take a few minutes to complete the written survey that you may receive in the mail after your visit with us. Thank you!             Your Updated Medication List - Protect others around you: Learn how to safely use, store and throw away your medicines at www.disposemymeds.org.          This list is accurate as of: 9/6/17 11:59 PM.  Always use your most recent med list.                   Brand Name Dispense Instructions for use Diagnosis    omeprazole 20 MG CR capsule    priLOSEC     daily        PAXIL 10 MG tablet   Generic drug:  PARoxetine           " PROBIOTIC DAILY PO      Take by mouth daily        traZODone 50 MG tablet    DESYREL     50 mg At Bedtime        UNABLE TO FIND      MEDICATION NAME: Bio Cleanse - OTC

## 2017-09-06 NOTE — NURSING NOTE
"Reason For Visit:   Chief Complaint   Patient presents with     RECHECK     F/U to discuss surgery on left knee.        Primary MD: Lauren Yip Np    Ht 1.702 m (5' 7\")  Wt 74.8 kg (165 lb)  BMI 25.84 kg/m2    Pain Assessment  Patient Currently in Pain: Yes  0-10 Pain Scale: 5  Primary Pain Location: Knee  Pain Orientation: Left  Pain Descriptors: Discomfort, Burning, Constant  Alleviating Factors: Rest  Aggravating Factors: Walking, Standing, Stairs    Current Outpatient Prescriptions   Medication     PARoxetine (PAXIL) 10 MG tablet     omeprazole (PRILOSEC) 20 MG capsule     traZODone (DESYREL) 50 MG tablet     Probiotic Product (PROBIOTIC DAILY PO)     UNABLE TO FIND     Current Facility-Administered Medications   Medication     lidocaine 1 % injection 8 mL     triamcinolone acetonide (KENALOG-40) injection 40 mg        No Known Allergies  "

## 2017-09-06 NOTE — PROGRESS NOTES
[facility], Orthopaedic Surgery, Arthritis, Hip and Knee Replacement    Mariana Royal MRN# [id number]   Age: 46 year old YOB: 1970     Requesting physician: Nathan Kelley*              History of Present Illness:   Mariana returns for follow-up of her left medial compartment arthritis. Since her last visit she has had severe and progressive pain. She is unable to do normal activities of daily living such as walk with her family. She is also a  in [employer] and she has difficulty doing her job. At this point she has been using crutches to offload the knee due to the severe pain and swelling. She had previously had multiple injections, which provide good temporary relief of her symptoms. She is quite frustrated by the limitations of her activities of daily living and is interested in proceeding with partial knee replacement at this point. Otherwise, there have been no changes to her symptoms or medical history.               Past Medical History:   There is no problem list on file for this patient.    Past Medical History:   Diagnosis Date     Arthritis      Prior history of blood clot: none  Prior history of bleeding problems: none  Prior history of anesthetic complications: none  Currently on opioids:  none  History of Diabetes: no           Past Surgical History:     Past Surgical History:   Procedure Laterality Date     APPENDECTOMY       ARTHROSCOPY KNEE Left 6/7/2016    Procedure: ARTHROSCOPY KNEE;  Surgeon: Nathan Stewart MD;  Location:  OR     KNEE SURGERY      right x 5     MOUTH SURGERY              Social History:     Social History     Social History     Marital status:      Spouse name: N/A     Number of children: N/A     Years of education: N/A     Occupational History     Not on file.     Social History Main Topics     Smoking status: Never Smoker     Smokeless tobacco: Not on file     Alcohol use Yes      Comment:  "social     Drug use: No     Sexual activity: Not on file     Other Topics Concern     Not on file     Social History Narrative     From [place].  Non smoker, elementary .           Family History:       Family History   Problem Relation Age of Onset     Other Cancer Mother      Hypertension Father      Other Cancer Father             Medications:     Current Outpatient Prescriptions   Medication Sig     PARoxetine (PAXIL) 10 MG tablet      omeprazole (PRILOSEC) 20 MG capsule daily      traZODone (DESYREL) 50 MG tablet 50 mg At Bedtime      Probiotic Product (PROBIOTIC DAILY PO) Take by mouth daily      UNABLE TO FIND MEDICATION NAME: Bio Cleanse - OTC     Current Facility-Administered Medications   Medication     lidocaine 1 % injection 8 mL     triamcinolone acetonide (KENALOG-40) injection 40 mg              Physical Exam:     EXAMINATION pertinent findings:   VITAL SIGNS: Height 1.702 m (5' 7\"), weight 74.8 kg (165 lb).  Body mass index is 25.84 kg/(m^2).  GEN: AOx3, appears stated age, cooperative, no distress  HEENT: normocephalic, atraumatic  RESP: non labored breathing   ABD: benign   SKIN: No rashes or open lesions   CV: Non-tachycardic   NEURO: CN2-12 grossly intact   VASCULAR: 2+ DP pulse   MUSCULOSKELETAL:   Gait: patient walks with a normal gait.   They do achieve full extension at the knee.    Left painful  Knee  Overall limb alignment is: Varus  Effusion or swelling of the knee: none  Tenderness to palpation: yes, pain localizes to the medial joint line  ROM: 0 to 135  Pain with knee ROM: none  Extensor lag: none  MCL stability: Stable corretable to neutral   Lateral Stability: Stable  Lachman: stable  Posterior stability: stable  Pain with passive full hip range of motion: none  Prior surgical incision: none    Motor: normal ehl/fhl/gs/at strength  Sensory: normal sensation in DP/SP/T/S/S distributions  Vascular: 2+ PT pulse           Data:   Imaging:   New imaging was obtained which " again demonstrates the mild to moderate medial compartment osteoarthritis.  Again, there is no evidence of lateral or patellofemoral joint osteoarthritis.  There does appear to be a small amount of cartilage left.      However, in reviewing the prior arthroscopic images, there are areas of full-thickness cartilage loss.                Assessment and Plan:   Assessment:  Mariana is a very pleasant 47-year-old woman who had been following for many months for her left knee medial compartment osteoarthritis. She has failed prior treatments including arthroscopic surgery, multiple injections, which provided good temporary relief of her symptoms, medications, activity modifications, gait aids. She has severe debilitating pain that is limiting her normal activities of daily living. 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 She had a prior partial knee replacement done many years ago and has an excellent result following that. We discussed the risks and benefits of partial versus total knee replacement. Given that her symptoms are all medial based and her imaging is very reassuring as was her arthroscopic images for lack of arthritis in the patellofemoral or lateral compartment, she is interested in proceeding with partial knee replacement. We specifically reviewed the side higher risk of revision to total knee in the setting of partial knee replacement. We also reviewed the risks of implant loosening, progression of lateral or patellofemoral arthritis, infection, wound healing problems, knee stiffness, nerve injury, blood loss, and medical complications. Following that discussion she expressed interest in proceeding with left partial knee replacement. We will work on scheduling surgery at a time that works well for her.    Israel Vogt M.D.     Arthritis and Joint Replacement  Department of Orthopaedic Surgery, [facility]  [email or web address]  [phone]  (pager)    Answers for HPI/ROS submitted by the patient on 9/6/2017   General Symptoms: No  Skin Symptoms: No  HENT Symptoms: No  EYE SYMPTOMS: No  HEART SYMPTOMS: No  LUNG SYMPTOMS: No  INTESTINAL SYMPTOMS: No  URINARY SYMPTOMS: No  GYNECOLOGIC SYMPTOMS: No  BREAST SYMPTOMS: No  SKELETAL SYMPTOMS: Yes  BLOOD SYMPTOMS: No  NERVOUS SYSTEM SYMPTOMS: No  MENTAL HEALTH SYMPTOMS: No  Back pain: No  Muscle aches: Yes  Neck pain: Yes  Swollen joints: Yes  Joint pain: Yes  Bone pain: No  Muscle cramps: No  Muscle weakness: No  Joint stiffness: Yes  Bone fracture: No

## 2017-10-09 ENCOUNTER — ANESTHESIA EVENT (OUTPATIENT)
Dept: SURGERY | Facility: CLINIC | Age: 47
End: 2017-10-09
Payer: COMMERCIAL

## 2017-10-09 NOTE — ANESTHESIA PREPROCEDURE EVALUATION
Anesthesia Evaluation     . Pt has had prior anesthetic. Type: General and Regional    No history of anesthetic complications          ROS/MED HX    ENT/Pulmonary:  - neg pulmonary ROS     Neurologic:  - neg neurologic ROS     Cardiovascular:  - neg cardiovascular ROS   (+) ----. : . . . :. . No previous cardiac testing       METS/Exercise Tolerance:  >4 METS   Hematologic:         Musculoskeletal:         GI/Hepatic:     (+) GERD       Renal/Genitourinary:         Endo:  - neg endo ROS       Psychiatric:  - neg psychiatric ROS       Infectious Disease:         Malignancy:         Other:                   Procedure: Procedure(s):  Left Partial Knee Replacement, (Choice Anesthesia)  - Wound Class:     HPI: Mariana Royal is a 47 year old female who presents for the above procedure with Dr. Vogt.    PMHx/PSHx:  Past Medical History:   Diagnosis Date     Anxiety      Arthritis        Past Surgical History:   Procedure Laterality Date     APPENDECTOMY       ARTHROSCOPY KNEE Left 6/7/2016    Procedure: ARTHROSCOPY KNEE;  Surgeon: Nathan Stewart MD;  Location: [facility] OR     KNEE SURGERY      right x 5     MOUTH SURGERY           Current Facility-Administered Medications on File Prior to Encounter:  triamcinolone acetonide (KENALOG-40) injection 40 mg     Current Outpatient Prescriptions on File Prior to Encounter:  PARoxetine (PAXIL) 10 MG tablet    omeprazole (PRILOSEC) 20 MG capsule daily    traZODone (DESYREL) 50 MG tablet 50 mg At Bedtime    Probiotic Product (PROBIOTIC DAILY PO) Take by mouth daily    UNABLE TO FIND MEDICATION NAME: Bio Cleanse - OTC       Social Hx:   Social History   Substance Use Topics     Smoking status: Never Smoker     Smokeless tobacco: Never Used     Alcohol use Yes      Comment: social       Allergies: No Known Allergies      NPO Status: Per ASA Guidelines    Labs:    Blood Bank:  Lab Results   Component Value Date    ABO A 09/23/2009    RH  Pos 09/23/2009    AS Neg  09/23/2009     BMP:  Recent Labs   Lab Test  09/23/09   0740   NA  138   POTASSIUM  4.2   CHLORIDE  104   CO2  27   BUN  12   CR  0.65   GLC  84   YULIANA  9.2     CBC:   Recent Labs   Lab Test  09/23/09   0740   HGB  13.4     Coags:  Recent Labs   Lab Test  09/23/09   0740   INR  1.08   PTT  28                     PAC Discussion and Assessment    ASA Classification: 1  Case is suitable for: [place]  Anesthetic techniques and relevant risks discussed:   Invasive monitoring and risk discussed:   Types:   Possibility and Risk of blood transfusion discussed:   NPO instructions given:   Additional anesthetic preparation and risks discussed:   Needs early admission to pre-op area:   Other:     PAC Resident/NP Anesthesia Assessment:        Mid-Level Provider/Resident:   Date:   Time:     Attending Anesthesiologist Anesthesia Assessment:        Anesthesiologist:   Date:   Time:   Pass/Fail:   Disposition:     PAC Pharmacist Assessment:        Pharmacist:   Date:   Time:      Anesthesia Plan      History & Physical Review  History and physical reviewed and following examination; no interval change.    ASA Status:  2 .        Plan for Spinal Maintenance will be TIVA.    PONV prophylaxis:  Ondansetron (or other 5HT-3) (propofol gtt)       Postoperative Care  Postoperative pain management:  Multi-modal analgesia.      Consents  Anesthetic plan, risks, benefits and alternatives discussed with:  Patient.  Use of blood products discussed: Yes.   Use of blood products discussed with Patient.  Consented to blood products.  .        - ASA 2  - GETA with standard ASA monitors, IV induction, balanced anesthetic  - PIV  - Antibiotics per surgery  - PONV prophylaxis  - Pain management with Fentanyl/dilaudid boluses    Katharina Lagos MD CA-1                      .

## 2017-10-10 ENCOUNTER — HOSPITAL ENCOUNTER (OUTPATIENT)
Facility: CLINIC | Age: 47
LOS: 1 days | Discharge: HOME | End: 2017-10-11
Attending: ORTHOPAEDIC SURGERY | Admitting: ORTHOPAEDIC SURGERY
Payer: COMMERCIAL

## 2017-10-10 ENCOUNTER — ANESTHESIA (OUTPATIENT)
Dept: SURGERY | Facility: CLINIC | Age: 47
End: 2017-10-10
Payer: COMMERCIAL

## 2017-10-10 ENCOUNTER — SURGERY (OUTPATIENT)
Age: 47
End: 2017-10-10

## 2017-10-10 ENCOUNTER — APPOINTMENT (OUTPATIENT)
Dept: GENERAL RADIOLOGY | Facility: CLINIC | Age: 47
End: 2017-10-10
Attending: PHYSICIAN ASSISTANT
Payer: COMMERCIAL

## 2017-10-10 DIAGNOSIS — Z98.890 STATUS POST KNEE SURGERY: Primary | ICD-10-CM

## 2017-10-10 LAB
ABO + RH BLD: NORMAL
ABO + RH BLD: NORMAL
BLD GP AB SCN SERPL QL: NORMAL
BLOOD BANK CMNT PATIENT-IMP: NORMAL
ERYTHROCYTE [DISTWIDTH] IN BLOOD BY AUTOMATED COUNT: 12.7 % (ref 10–15)
GLUCOSE SERPL-MCNC: 91 MG/DL (ref 70–99)
HCG UR QL: NEGATIVE
HCT VFR BLD AUTO: 39.5 % (ref 35–47)
HGB BLD-MCNC: 13.4 G/DL (ref 11.7–15.7)
INR PPP: 1.01 (ref 0.86–1.14)
MCH RBC QN AUTO: 31.5 PG (ref 26.5–33)
MCHC RBC AUTO-ENTMCNC: 33.9 G/DL (ref 31.5–36.5)
MCV RBC AUTO: 93 FL (ref 78–100)
PLATELET # BLD AUTO: 197 10E9/L (ref 150–450)
RBC # BLD AUTO: 4.25 10E12/L (ref 3.8–5.2)
SPECIMEN EXP DATE BLD: NORMAL
WBC # BLD AUTO: 5.6 10E9/L (ref 4–11)

## 2017-10-10 PROCEDURE — 85610 PROTHROMBIN TIME: CPT | Performed by: ANESTHESIOLOGY

## 2017-10-10 PROCEDURE — 25000125 ZZHC RX 250: Performed by: ORTHOPAEDIC SURGERY

## 2017-10-10 PROCEDURE — 86850 RBC ANTIBODY SCREEN: CPT | Performed by: ANESTHESIOLOGY

## 2017-10-10 PROCEDURE — 25000125 ZZHC RX 250: Performed by: STUDENT IN AN ORGANIZED HEALTH CARE EDUCATION/TRAINING PROGRAM

## 2017-10-10 PROCEDURE — 27810169 ZZH OR IMPLANT GENERAL: Performed by: ORTHOPAEDIC SURGERY

## 2017-10-10 PROCEDURE — 85027 COMPLETE CBC AUTOMATED: CPT | Performed by: ANESTHESIOLOGY

## 2017-10-10 PROCEDURE — 36000064 ZZH SURGERY LEVEL 4 EA 15 ADDTL MIN - UMMC: Performed by: ORTHOPAEDIC SURGERY

## 2017-10-10 PROCEDURE — 40000170 ZZH STATISTIC PRE-PROCEDURE ASSESSMENT II: Performed by: ORTHOPAEDIC SURGERY

## 2017-10-10 PROCEDURE — 71000012 ZZH RECOVERY PHASE 1 LEVEL 1 FIRST HR: Performed by: ORTHOPAEDIC SURGERY

## 2017-10-10 PROCEDURE — 12000001 ZZH R&B MED SURG/OB UMMC

## 2017-10-10 PROCEDURE — 82947 ASSAY GLUCOSE BLOOD QUANT: CPT | Performed by: ANESTHESIOLOGY

## 2017-10-10 PROCEDURE — 25800025 ZZH RX 258: Performed by: ORTHOPAEDIC SURGERY

## 2017-10-10 PROCEDURE — 40000986 XR KNEE PORT LT 1/2 VW: Mod: LT

## 2017-10-10 PROCEDURE — 25000132 ZZH RX MED GY IP 250 OP 250 PS 637: Performed by: NURSE PRACTITIONER

## 2017-10-10 PROCEDURE — C1776 JOINT DEVICE (IMPLANTABLE): HCPCS | Performed by: ORTHOPAEDIC SURGERY

## 2017-10-10 PROCEDURE — C1713 ANCHOR/SCREW BN/BN,TIS/BN: HCPCS | Performed by: ORTHOPAEDIC SURGERY

## 2017-10-10 PROCEDURE — 36000062 ZZH SURGERY LEVEL 4 1ST 30 MIN - UMMC: Performed by: ORTHOPAEDIC SURGERY

## 2017-10-10 PROCEDURE — 81025 URINE PREGNANCY TEST: CPT | Performed by: ANESTHESIOLOGY

## 2017-10-10 PROCEDURE — 25000132 ZZH RX MED GY IP 250 OP 250 PS 637: Performed by: PHYSICIAN ASSISTANT

## 2017-10-10 PROCEDURE — 86900 BLOOD TYPING SEROLOGIC ABO: CPT | Performed by: ANESTHESIOLOGY

## 2017-10-10 PROCEDURE — 36415 COLL VENOUS BLD VENIPUNCTURE: CPT | Performed by: ANESTHESIOLOGY

## 2017-10-10 PROCEDURE — 37000008 ZZH ANESTHESIA TECHNICAL FEE, 1ST 30 MIN: Performed by: ORTHOPAEDIC SURGERY

## 2017-10-10 PROCEDURE — 37000009 ZZH ANESTHESIA TECHNICAL FEE, EACH ADDTL 15 MIN: Performed by: ORTHOPAEDIC SURGERY

## 2017-10-10 PROCEDURE — 25000132 ZZH RX MED GY IP 250 OP 250 PS 637: Performed by: STUDENT IN AN ORGANIZED HEALTH CARE EDUCATION/TRAINING PROGRAM

## 2017-10-10 PROCEDURE — 25000132 ZZH RX MED GY IP 250 OP 250 PS 637: Performed by: ORTHOPAEDIC SURGERY

## 2017-10-10 PROCEDURE — 86901 BLOOD TYPING SEROLOGIC RH(D): CPT | Performed by: ANESTHESIOLOGY

## 2017-10-10 PROCEDURE — G0008 ADMIN INFLUENZA VIRUS VAC: HCPCS

## 2017-10-10 PROCEDURE — 25000128 H RX IP 250 OP 636: Performed by: STUDENT IN AN ORGANIZED HEALTH CARE EDUCATION/TRAINING PROGRAM

## 2017-10-10 PROCEDURE — 25000128 H RX IP 250 OP 636: Performed by: ORTHOPAEDIC SURGERY

## 2017-10-10 PROCEDURE — 27210995 ZZH RX 272: Performed by: ORTHOPAEDIC SURGERY

## 2017-10-10 PROCEDURE — 25000128 H RX IP 250 OP 636: Performed by: PHYSICIAN ASSISTANT

## 2017-10-10 PROCEDURE — 27210794 ZZH OR GENERAL SUPPLY STERILE: Performed by: ORTHOPAEDIC SURGERY

## 2017-10-10 DEVICE — IMPLANTABLE DEVICE: Type: IMPLANTABLE DEVICE | Site: KNEE | Status: FUNCTIONAL

## 2017-10-10 DEVICE — BONE CEMENT SIMPLEX W/TOBRAMYCIN 6197-9-001: Type: IMPLANTABLE DEVICE | Site: KNEE | Status: FUNCTIONAL

## 2017-10-10 RX ORDER — CEFAZOLIN SODIUM 1 G/3ML
1 INJECTION, POWDER, FOR SOLUTION INTRAMUSCULAR; INTRAVENOUS SEE ADMIN INSTRUCTIONS
Status: DISCONTINUED | OUTPATIENT
Start: 2017-10-10 | End: 2017-10-10 | Stop reason: HOSPADM

## 2017-10-10 RX ORDER — LIDOCAINE 40 MG/G
CREAM TOPICAL
Status: DISCONTINUED | OUTPATIENT
Start: 2017-10-10 | End: 2017-10-11 | Stop reason: HOSPADM

## 2017-10-10 RX ORDER — ONDANSETRON 4 MG/1
4 TABLET, ORALLY DISINTEGRATING ORAL EVERY 30 MIN PRN
Status: DISCONTINUED | OUTPATIENT
Start: 2017-10-10 | End: 2017-10-10 | Stop reason: HOSPADM

## 2017-10-10 RX ORDER — PAROXETINE 10 MG/1
10 TABLET, FILM COATED ORAL EVERY EVENING
Status: DISCONTINUED | OUTPATIENT
Start: 2017-10-10 | End: 2017-10-11 | Stop reason: HOSPADM

## 2017-10-10 RX ORDER — HYDROMORPHONE HYDROCHLORIDE 2 MG/1
2-4 TABLET ORAL
Status: DISCONTINUED | OUTPATIENT
Start: 2017-10-10 | End: 2017-10-11 | Stop reason: HOSPADM

## 2017-10-10 RX ORDER — LIDOCAINE 40 MG/G
CREAM TOPICAL
Status: DISCONTINUED | OUTPATIENT
Start: 2017-10-10 | End: 2017-10-10 | Stop reason: HOSPADM

## 2017-10-10 RX ORDER — SODIUM CHLORIDE, SODIUM LACTATE, POTASSIUM CHLORIDE, CALCIUM CHLORIDE 600; 310; 30; 20 MG/100ML; MG/100ML; MG/100ML; MG/100ML
INJECTION, SOLUTION INTRAVENOUS CONTINUOUS PRN
Status: DISCONTINUED | OUTPATIENT
Start: 2017-10-10 | End: 2017-10-10

## 2017-10-10 RX ORDER — LABETALOL HYDROCHLORIDE 5 MG/ML
10 INJECTION, SOLUTION INTRAVENOUS
Status: DISCONTINUED | OUTPATIENT
Start: 2017-10-10 | End: 2017-10-10 | Stop reason: HOSPADM

## 2017-10-10 RX ORDER — ACETAMINOPHEN 325 MG/1
650 TABLET ORAL EVERY 4 HOURS PRN
Status: DISCONTINUED | OUTPATIENT
Start: 2017-10-13 | End: 2017-10-11 | Stop reason: HOSPADM

## 2017-10-10 RX ORDER — CEFAZOLIN SODIUM 2 G/100ML
2 INJECTION, SOLUTION INTRAVENOUS
Status: COMPLETED | OUTPATIENT
Start: 2017-10-10 | End: 2017-10-10

## 2017-10-10 RX ORDER — ONDANSETRON 2 MG/ML
4 INJECTION INTRAMUSCULAR; INTRAVENOUS EVERY 6 HOURS PRN
Status: DISCONTINUED | OUTPATIENT
Start: 2017-10-10 | End: 2017-10-11 | Stop reason: HOSPADM

## 2017-10-10 RX ORDER — ACETAMINOPHEN 325 MG/1
975 TABLET ORAL ONCE
Status: DISCONTINUED | OUTPATIENT
Start: 2017-10-10 | End: 2017-10-10 | Stop reason: HOSPADM

## 2017-10-10 RX ORDER — SODIUM CHLORIDE 9 MG/ML
INJECTION, SOLUTION INTRAVENOUS CONTINUOUS
Status: DISCONTINUED | OUTPATIENT
Start: 2017-10-10 | End: 2017-10-11 | Stop reason: HOSPADM

## 2017-10-10 RX ORDER — GABAPENTIN 300 MG/1
300 CAPSULE ORAL ONCE
Status: COMPLETED | OUTPATIENT
Start: 2017-10-10 | End: 2017-10-10

## 2017-10-10 RX ORDER — DEXAMETHASONE SODIUM PHOSPHATE 4 MG/ML
INJECTION, SOLUTION INTRA-ARTICULAR; INTRALESIONAL; INTRAMUSCULAR; INTRAVENOUS; SOFT TISSUE PRN
Status: DISCONTINUED | OUTPATIENT
Start: 2017-10-10 | End: 2017-10-10

## 2017-10-10 RX ORDER — OXYCODONE HYDROCHLORIDE 5 MG/1
5-10 TABLET ORAL
Status: DISCONTINUED | OUTPATIENT
Start: 2017-10-10 | End: 2017-10-10

## 2017-10-10 RX ORDER — GABAPENTIN 300 MG/1
300 CAPSULE ORAL ONCE
Status: DISCONTINUED | OUTPATIENT
Start: 2017-10-10 | End: 2017-10-10 | Stop reason: HOSPADM

## 2017-10-10 RX ORDER — PROPOFOL 10 MG/ML
INJECTION, EMULSION INTRAVENOUS PRN
Status: DISCONTINUED | OUTPATIENT
Start: 2017-10-10 | End: 2017-10-10

## 2017-10-10 RX ORDER — ASPIRIN 81 MG/1
81 TABLET ORAL 2 TIMES DAILY
Status: DISCONTINUED | OUTPATIENT
Start: 2017-10-10 | End: 2017-10-11 | Stop reason: HOSPADM

## 2017-10-10 RX ORDER — NALOXONE HYDROCHLORIDE 0.4 MG/ML
.1-.4 INJECTION, SOLUTION INTRAMUSCULAR; INTRAVENOUS; SUBCUTANEOUS
Status: DISCONTINUED | OUTPATIENT
Start: 2017-10-10 | End: 2017-10-11 | Stop reason: HOSPADM

## 2017-10-10 RX ORDER — ACETAMINOPHEN 325 MG/1
975 TABLET ORAL EVERY 8 HOURS
Status: DISCONTINUED | OUTPATIENT
Start: 2017-10-10 | End: 2017-10-11 | Stop reason: HOSPADM

## 2017-10-10 RX ORDER — CELECOXIB 200 MG/1
400 CAPSULE ORAL ONCE
Status: COMPLETED | OUTPATIENT
Start: 2017-10-10 | End: 2017-10-10

## 2017-10-10 RX ORDER — PROPOFOL 10 MG/ML
INJECTION, EMULSION INTRAVENOUS CONTINUOUS PRN
Status: DISCONTINUED | OUTPATIENT
Start: 2017-10-10 | End: 2017-10-10

## 2017-10-10 RX ORDER — ACETAMINOPHEN 500 MG
1000 TABLET ORAL ONCE
Status: COMPLETED | OUTPATIENT
Start: 2017-10-10 | End: 2017-10-10

## 2017-10-10 RX ORDER — FENTANYL CITRATE 50 UG/ML
25-50 INJECTION, SOLUTION INTRAMUSCULAR; INTRAVENOUS
Status: DISCONTINUED | OUTPATIENT
Start: 2017-10-10 | End: 2017-10-10 | Stop reason: HOSPADM

## 2017-10-10 RX ORDER — AMOXICILLIN 250 MG
1-2 CAPSULE ORAL 2 TIMES DAILY
Status: DISCONTINUED | OUTPATIENT
Start: 2017-10-10 | End: 2017-10-11 | Stop reason: HOSPADM

## 2017-10-10 RX ORDER — HYDROMORPHONE HYDROCHLORIDE 1 MG/ML
.2-.4 INJECTION, SOLUTION INTRAMUSCULAR; INTRAVENOUS; SUBCUTANEOUS
Status: DISCONTINUED | OUTPATIENT
Start: 2017-10-10 | End: 2017-10-11 | Stop reason: HOSPADM

## 2017-10-10 RX ORDER — ONDANSETRON 4 MG/1
4 TABLET, ORALLY DISINTEGRATING ORAL EVERY 6 HOURS PRN
Status: DISCONTINUED | OUTPATIENT
Start: 2017-10-10 | End: 2017-10-11 | Stop reason: HOSPADM

## 2017-10-10 RX ORDER — SODIUM CHLORIDE, SODIUM LACTATE, POTASSIUM CHLORIDE, CALCIUM CHLORIDE 600; 310; 30; 20 MG/100ML; MG/100ML; MG/100ML; MG/100ML
INJECTION, SOLUTION INTRAVENOUS CONTINUOUS
Status: DISCONTINUED | OUTPATIENT
Start: 2017-10-10 | End: 2017-10-10 | Stop reason: HOSPADM

## 2017-10-10 RX ORDER — TRAMADOL HYDROCHLORIDE 50 MG/1
50 TABLET ORAL
Status: COMPLETED | OUTPATIENT
Start: 2017-10-10 | End: 2017-10-10

## 2017-10-10 RX ORDER — MAGNESIUM HYDROXIDE 1200 MG/15ML
LIQUID ORAL PRN
Status: DISCONTINUED | OUTPATIENT
Start: 2017-10-10 | End: 2017-10-10 | Stop reason: HOSPADM

## 2017-10-10 RX ORDER — ONDANSETRON 2 MG/ML
4 INJECTION INTRAMUSCULAR; INTRAVENOUS EVERY 30 MIN PRN
Status: DISCONTINUED | OUTPATIENT
Start: 2017-10-10 | End: 2017-10-10 | Stop reason: HOSPADM

## 2017-10-10 RX ORDER — CEFAZOLIN SODIUM 1 G/3ML
1 INJECTION, POWDER, FOR SOLUTION INTRAMUSCULAR; INTRAVENOUS EVERY 8 HOURS
Status: COMPLETED | OUTPATIENT
Start: 2017-10-10 | End: 2017-10-11

## 2017-10-10 RX ORDER — TRAZODONE HYDROCHLORIDE 50 MG/1
50 TABLET, FILM COATED ORAL AT BEDTIME
Status: DISCONTINUED | OUTPATIENT
Start: 2017-10-10 | End: 2017-10-11 | Stop reason: HOSPADM

## 2017-10-10 RX ORDER — ONDANSETRON 2 MG/ML
INJECTION INTRAMUSCULAR; INTRAVENOUS PRN
Status: DISCONTINUED | OUTPATIENT
Start: 2017-10-10 | End: 2017-10-10

## 2017-10-10 RX ORDER — NALOXONE HYDROCHLORIDE 0.4 MG/ML
.1-.4 INJECTION, SOLUTION INTRAMUSCULAR; INTRAVENOUS; SUBCUTANEOUS
Status: DISCONTINUED | OUTPATIENT
Start: 2017-10-10 | End: 2017-10-10

## 2017-10-10 RX ADMIN — PHENYLEPHRINE HYDROCHLORIDE 50 MCG: 10 INJECTION, SOLUTION INTRAMUSCULAR; INTRAVENOUS; SUBCUTANEOUS at 08:24

## 2017-10-10 RX ADMIN — HYDROMORPHONE HYDROCHLORIDE 2 MG: 2 TABLET ORAL at 13:28

## 2017-10-10 RX ADMIN — HYDROMORPHONE HYDROCHLORIDE 2 MG: 2 TABLET ORAL at 19:31

## 2017-10-10 RX ADMIN — SODIUM CHLORIDE: 9 INJECTION, SOLUTION INTRAVENOUS at 19:32

## 2017-10-10 RX ADMIN — TRAZODONE HYDROCHLORIDE 50 MG: 50 TABLET ORAL at 21:22

## 2017-10-10 RX ADMIN — PHENYLEPHRINE HYDROCHLORIDE 100 MCG: 10 INJECTION, SOLUTION INTRAMUSCULAR; INTRAVENOUS; SUBCUTANEOUS at 08:45

## 2017-10-10 RX ADMIN — PAROXETINE 10 MG: 10 TABLET, FILM COATED ORAL at 19:31

## 2017-10-10 RX ADMIN — CEFAZOLIN SODIUM 2 G: 2 INJECTION, SOLUTION INTRAVENOUS at 08:05

## 2017-10-10 RX ADMIN — ROPIVACAINE HYDROCHLORIDE 50 ML: 10 INJECTION, SOLUTION EPIDURAL at 09:34

## 2017-10-10 RX ADMIN — PHENYLEPHRINE HYDROCHLORIDE 100 MCG: 10 INJECTION, SOLUTION INTRAMUSCULAR; INTRAVENOUS; SUBCUTANEOUS at 09:55

## 2017-10-10 RX ADMIN — TRAMADOL HYDROCHLORIDE 50 MG: 50 TABLET, COATED ORAL at 07:02

## 2017-10-10 RX ADMIN — PHENYLEPHRINE HYDROCHLORIDE 50 MCG: 10 INJECTION, SOLUTION INTRAMUSCULAR; INTRAVENOUS; SUBCUTANEOUS at 08:19

## 2017-10-10 RX ADMIN — ROPIVACAINE HYDROCHLORIDE 50 ML: 10 INJECTION, SOLUTION EPIDURAL at 09:35

## 2017-10-10 RX ADMIN — SODIUM CHLORIDE 1 G: 9 INJECTION, SOLUTION INTRAVENOUS at 09:23

## 2017-10-10 RX ADMIN — SENNOSIDES AND DOCUSATE SODIUM 2 TABLET: 8.6; 5 TABLET ORAL at 19:31

## 2017-10-10 RX ADMIN — PHENYLEPHRINE HYDROCHLORIDE 50 MCG: 10 INJECTION, SOLUTION INTRAMUSCULAR; INTRAVENOUS; SUBCUTANEOUS at 08:59

## 2017-10-10 RX ADMIN — PHENYLEPHRINE HYDROCHLORIDE 100 MCG: 10 INJECTION, SOLUTION INTRAMUSCULAR; INTRAVENOUS; SUBCUTANEOUS at 09:07

## 2017-10-10 RX ADMIN — HYDROMORPHONE HYDROCHLORIDE 2 MG: 2 TABLET ORAL at 22:44

## 2017-10-10 RX ADMIN — PROPOFOL 20 MG: 10 INJECTION, EMULSION INTRAVENOUS at 09:04

## 2017-10-10 RX ADMIN — SODIUM CHLORIDE 100 ML: 900 IRRIGANT IRRIGATION at 09:36

## 2017-10-10 RX ADMIN — CEFAZOLIN 1 G: 1 INJECTION, POWDER, FOR SOLUTION INTRAMUSCULAR; INTRAVENOUS at 16:44

## 2017-10-10 RX ADMIN — ACETAMINOPHEN 1000 MG: 500 TABLET ORAL at 07:02

## 2017-10-10 RX ADMIN — PROPOFOL 20 MG: 10 INJECTION, EMULSION INTRAVENOUS at 08:30

## 2017-10-10 RX ADMIN — SODIUM CHLORIDE, POTASSIUM CHLORIDE, SODIUM LACTATE AND CALCIUM CHLORIDE: 600; 310; 30; 20 INJECTION, SOLUTION INTRAVENOUS at 07:45

## 2017-10-10 RX ADMIN — ACETAMINOPHEN 975 MG: 325 TABLET, FILM COATED ORAL at 13:28

## 2017-10-10 RX ADMIN — SODIUM CHLORIDE 1 G: 9 INJECTION, SOLUTION INTRAVENOUS at 08:15

## 2017-10-10 RX ADMIN — PROPOFOL 20 MG: 10 INJECTION, EMULSION INTRAVENOUS at 08:22

## 2017-10-10 RX ADMIN — PROPOFOL 70 MCG/KG/MIN: 10 INJECTION, EMULSION INTRAVENOUS at 08:15

## 2017-10-10 RX ADMIN — PHENYLEPHRINE HYDROCHLORIDE 100 MCG: 10 INJECTION, SOLUTION INTRAMUSCULAR; INTRAVENOUS; SUBCUTANEOUS at 09:42

## 2017-10-10 RX ADMIN — ACETAMINOPHEN 975 MG: 325 TABLET, FILM COATED ORAL at 21:22

## 2017-10-10 RX ADMIN — PHENYLEPHRINE HYDROCHLORIDE 100 MCG: 10 INJECTION, SOLUTION INTRAMUSCULAR; INTRAVENOUS; SUBCUTANEOUS at 09:38

## 2017-10-10 RX ADMIN — PROPOFOL 10 MG: 10 INJECTION, EMULSION INTRAVENOUS at 09:18

## 2017-10-10 RX ADMIN — PROPOFOL 10 MG: 10 INJECTION, EMULSION INTRAVENOUS at 09:26

## 2017-10-10 RX ADMIN — CEFAZOLIN 1 G: 1 INJECTION, POWDER, FOR SOLUTION INTRAMUSCULAR; INTRAVENOUS at 23:33

## 2017-10-10 RX ADMIN — DEXAMETHASONE SODIUM PHOSPHATE 4 MG: 4 INJECTION, SOLUTION INTRAMUSCULAR; INTRAVENOUS at 09:32

## 2017-10-10 RX ADMIN — PHENYLEPHRINE HYDROCHLORIDE 100 MCG: 10 INJECTION, SOLUTION INTRAMUSCULAR; INTRAVENOUS; SUBCUTANEOUS at 09:33

## 2017-10-10 RX ADMIN — HYDROMORPHONE HYDROCHLORIDE 2 MG: 2 TABLET ORAL at 16:44

## 2017-10-10 RX ADMIN — PHENYLEPHRINE HYDROCHLORIDE 100 MCG: 10 INJECTION, SOLUTION INTRAMUSCULAR; INTRAVENOUS; SUBCUTANEOUS at 09:47

## 2017-10-10 RX ADMIN — GABAPENTIN 300 MG: 300 CAPSULE ORAL at 07:01

## 2017-10-10 RX ADMIN — CELECOXIB 400 MG: 200 CAPSULE ORAL at 07:01

## 2017-10-10 RX ADMIN — PHENYLEPHRINE HYDROCHLORIDE 50 MCG: 10 INJECTION, SOLUTION INTRAMUSCULAR; INTRAVENOUS; SUBCUTANEOUS at 08:32

## 2017-10-10 RX ADMIN — PHENYLEPHRINE HYDROCHLORIDE 100 MCG: 10 INJECTION, SOLUTION INTRAMUSCULAR; INTRAVENOUS; SUBCUTANEOUS at 08:10

## 2017-10-10 RX ADMIN — SODIUM CHLORIDE 100 ML: 900 IRRIGANT IRRIGATION at 09:00

## 2017-10-10 RX ADMIN — SODIUM CHLORIDE 500 ML: 900 IRRIGANT IRRIGATION at 09:37

## 2017-10-10 RX ADMIN — MIDAZOLAM HYDROCHLORIDE 2 MG: 1 INJECTION, SOLUTION INTRAMUSCULAR; INTRAVENOUS at 07:45

## 2017-10-10 RX ADMIN — ONDANSETRON 4 MG: 2 INJECTION INTRAMUSCULAR; INTRAVENOUS at 09:43

## 2017-10-10 RX ADMIN — ASPIRIN 81 MG: 81 TABLET, COATED ORAL at 19:32

## 2017-10-10 ASSESSMENT — ACTIVITIES OF DAILY LIVING (ADL)
TOILETING: 0-->INDEPENDENT
BATHING: 0-->INDEPENDENT
COGNITION: 0 - NO COGNITION ISSUES REPORTED
SWALLOWING: 0-->SWALLOWS FOODS/LIQUIDS WITHOUT DIFFICULTY
FALL_HISTORY_WITHIN_LAST_SIX_MONTHS: NO
TRANSFERRING: 0-->INDEPENDENT
AMBULATION: 0-->INDEPENDENT
DRESS: 0-->INDEPENDENT
RETIRED_COMMUNICATION: 0-->UNDERSTANDS/COMMUNICATES WITHOUT DIFFICULTY
RETIRED_EATING: 0-->INDEPENDENT

## 2017-10-10 NOTE — ANESTHESIA CARE TRANSFER NOTE
Patient: Mariana Royal    Procedure(s):  Left Partial Knee Replacement  - Wound Class: I-Clean    Diagnosis: Osteoarthritis  Diagnosis Additional Information: No value filed.    Anesthesia Type:   General     Note:  Airway :Face Mask  Patient transferred to:PACU  Comments: Vss, sats 100% on facemask, No pain on extubation, No adverse anesthesia events.          Vitals: (Last set prior to Anesthesia Care Transfer)    CRNA VITALS  10/10/2017 0949 - 10/10/2017 1019      10/10/2017             Ht Rate: 158                Electronically Signed By: Katharina Lagos MD  October 10, 2017  10:19 AM

## 2017-10-10 NOTE — IP AVS SNAPSHOT
[facility]    [address]    [place] [address]    Phone:  [phone]                                       After Visit Summary   10/10/2017    Mariana Royal    MRN: [id number]           After Visit Summary Signature Page     I have received my discharge instructions, and my questions have been answered. I have discussed any challenges I see with this plan with the nurse or doctor.    ..........................................................................................................................................  Patient/Patient Representative Signature      ..........................................................................................................................................  Patient Representative Print Name and Relationship to Patient    ..................................................               ................................................  Date                                            Time    ..........................................................................................................................................  Reviewed by Signature/Title    ...................................................              ..............................................  Date                                                            Time

## 2017-10-10 NOTE — ANESTHESIA POSTPROCEDURE EVALUATION
Patient: Mariana Royal    Procedure(s):  Left Partial Knee Replacement  - Wound Class: I-Clean    Diagnosis:Osteoarthritis  Diagnosis Additional Information: No value filed.    Anesthesia Type:  General    Note:  Anesthesia Post Evaluation    Patient location during evaluation: PACU  Patient participation: Able to fully participate in evaluation  Level of consciousness: awake and alert  Pain management: adequate  Airway patency: patent  Cardiovascular status: acceptable  Respiratory status: acceptable  Hydration status: acceptable  PONV: none     Anesthetic complications: None          Last vitals:  Vitals:    10/10/17 1045 10/10/17 1100 10/10/17 1115   BP: 116/51 111/57 109/55   Resp: 14 14 18   Temp:   36.7  C (98  F)   SpO2: 97%  98%         Electronically Signed By: Jane Dan MD  October 10, 2017  11:29 AM

## 2017-10-10 NOTE — H&P
Preoperative History and Physical Interval Update  [facility]    History: Patient presents for left UKA.  Since their preoperative evaluation, there have been no changes to their medical history or symptoms.  The patient has been seen by their primary care physician and deemed safe to undergo the planned surgical procedure.  The preoperative labs were reviewed.    The patient was met in the preoperative holding area.  We reviewed the surgical plan and written, informed consent was obtained.  The left knee was marked with an indelible marked after confirmation of the correct operative site from the patient.      Preoperative exam:  The skin overlying the surgical site is clean and dry.  There are well healed arthroscopic portals.    Operative extremity:   Motor: ehl/fhl/gs/at intact  Sensory: sensation intact dp/sp/t/s/s  Vascular: 2+ DP pulse      Imaging: Preoperative films were reviewed confirming right knee medial compartment OA.  Preoperative template was also reviewed.    Assessment:   Mariana Royal is a 47 year old female with right knee medial compartment OA.  The surgical plan is for right knee UKA.  We have discussed the pros/cons/risks/benefits and potential complications of surgery detail in clinic and here on the day of surgery.  We have discussed the rehab, the length of the hospitalization and the type of implants.  Signed informed consent was obtained.  All the patient's questions were answered to their satisfaction.

## 2017-10-10 NOTE — IP AVS SNAPSHOT
MRN:[id number]                      After Visit Summary   10/10/2017    Mariana Royal    MRN: [id number]           Thank you!     Thank you for choosing [facility] for your care. Our goal is always to provide you with excellent care. Hearing back from our patients is one way we can continue to improve our services. Please take a few minutes to complete the written survey that you may receive in the mail after you visit with us. Thank you!        Patient Information     Date Of Birth          1970        Designated Caregiver       Most Recent Value    Caregiver    Will someone help with your care after discharge? yes    Name of designated caregiver Phillip    Phone number of caregiver [phone]    Caregiver address [place]      About your hospital stay     You were admitted on:  October 10, 2017 You last received care in the:   8A    You were discharged on:  October 11, 2017        Reason for your hospital stay       You had knee surgery                  Who to Call     For medical emergencies, please call 911.  For non-urgent questions about your medical care, please call your primary care provider or clinic, [phone]  For questions related to your surgery, please call your surgery clinic        Attending Provider     Provider Specialty    Israel Vogt MD Orthopedics       Primary Care Provider Office Phone # Fax #    Eve Chahal [phone]      After Care Instructions     Discharge Instructions       Post Operative Instructions for Mariana Royal is a 47 year old female    Surgery: L Partial  Knee Replacement on 10/10/17      If you have any questions contact Dr. Vogt at the following numbers: if you see Dr. Vogt at [facility] call [phone].  If you see him at [facility] call [phone].      Follow up appointment:   You are scheduled to follow up with Dr. Vogt approximately 2 weeks after your surgery.  If you were seen at  "[facility], your appointment will most likely be there.  If you were seen at the [facility] at [facility], your appointment will likely be there.    Contact clinic if you have any questions about the date or time.    Anticoagulation:   Take the ASA 81 mg po BID  prescribed for the total of 29 days.  This is given to help minimize your risk of blood clot.    Activity:   -Continue to weight bear on your operative leg as tolerated by pain.  As you are more comfortable, you can discontinue use of the walker and transition to a cane.  Once you are comfortable with the cane, you can also wean from the cane and progress to using no assistive devices.  Do not transition until you are comfortable and have good balance.      -Continue to work on knee range of motion to prevent stiffness.      -When you are sitting, \"bridge the leg\" and keep the leg fully straight, with a bump under the heel to prevent a flexion contracture and ensure that you can fully straighten the knee.    -Work on getting your leg fully straight while walking - landing on your heel and progressing over the knee normally to prevent a flexion contracture.        Pain Medications:   -Take pain medications as prescribed.  Wean off the the narcotic pain medications (Oxycodone, Dilaudid, etc) as tolerated by pain.  Only take the narcotic pain medication if not relieved by the other medications.  To help with this, take the Tylenol and Celebrex (if prescribed) to minimize the amount of narcotic pain medication you need to take.      -Do not drive while on pain medications or until your leg feels well enough to do so.      Bandage Care and Showering:   -You can shower with the adhesive bandage covering your knee at the time of discharge.    -Remove the adhesive bandage 10 days after your surgery.  This can be removed at home.  Once removed, it is common to have a few scabs.  Let the scabs fall off on their own - they will do so over the next week or two.  The sutures are " resorbable and nothing needs to be removed.    --Once the bandage is removed, you can shower without covering the incision.  Do not soak or bathe the incision in water.  Do not scrub the incision.  Just let the water from the shower run over the incision.    --Contact Dr. Vogt or his staff if there is any drainage from the incision or redness.    Leg Swelling and Icing  -Continue icing the knee 5-6 times per day for approximately 20 minutes each time.  This will help with swelling.    -Some swelling in the leg is normal after surgery.  This type of swelling is usually gravity dependent and is improved in the morning after sleeping.  If the swelling is painful in the calf or the swelling is getting worse, contact Dr. Vogt's office.  We may recommend presenting to the Emergency Department to obtain an ultrasound of the leg if there is concern for a DVT.      -Elevate the leg if you are sitting as this will help reduce swelling.    Contact clinic if you note any of the following:  -Fevers greater than 101.5 chills  -Increased pain, redness, swelling or discharge at the surgical site.   -During regular business hours call Dr Vogt's office and request to speak with his nurse or the triage nurses. If you see him at [facility] call [phone]. If you see him at [facility] call [phone].   -After hours or on weekends call the hospital  at [phone] and ask to speak with the Orthopaedic resident on call.                  Future tests that were ordered for you     Assign Questionnaire Series to Patient                 Pending Results     No orders found for last 3 day(s).            Statement of Approval     Ordered          10/11/17 1046  I have reviewed and agree with all the recommendations and orders detailed in this document.  EFFECTIVE NOW     Approved and electronically signed by:  Rajwinder Mckeon APRN CNP             Admission Information     Date & Time Provider Department  "Dept. Phone    10/10/2017 Israel Vogt MD UR 8A [phone]      Your Vitals Were     Blood Pressure Pulse Temperature Respirations Height Weight    103/52 (BP Location: Left arm) 63 98.8  F (37.1  C) (Oral) 16 1.702 m (5' 7\") 76.3 kg (168 lb 3.4 oz)    Last Period Pulse Oximetry BMI (Body Mass Index)             2017 (Approximate) 100% 26.35 kg/m2         [employer] Information     [facility] lets you send messages to your doctor, view your test results, renew your prescriptions, schedule appointments and more. To sign up, go to [email or web address] . Click on \"Log in\" on the left side of the screen, which will take you to the Welcome page. Then click on \"Sign up Now\" on the right side of the page.     You will be asked to enter the access code listed below, as well as some personal information. Please follow the directions to create your username and password.     Your access code is: [email or web address]  Expires: 2017  4:29 PM     Your access code will  in 90 days. If you need help or a new code, please call your [facility] clinic or [phone].        Care EveryWhere ID     This is your Care EveryWhere ID. This could be used by other organizations to access your [facility] medical records  [id number]        Equal Access to Services     [facility] AH: Hadii alonso florentinoo Sojoe, waaxda luqadaha, qaybta kaalmada ricardoda, mónica quevedo. So [facility] [phone].    ATENCIÓN: Si habla español, tiene a harrison disposición servicios gratuitos de asistencia lingüística. Llame al [phone].    We comply with applicable federal civil rights laws and [place] laws. We do not discriminate on the basis of race, color, national origin, age, disability, sex, sexual orientation, or gender identity.               Review of your medicines      START taking        Dose / Directions    acetaminophen 325 MG tablet   Commonly known as:  TYLENOL        Dose:  650 mg   Start taking on:  " 10/13/2017   Take 2 tablets (650 mg) by mouth every 4 hours as needed for other (surgical pain)   Quantity:  100 tablet   Refills:  0       aspirin 81 MG EC tablet        Dose:  81 mg   Take 1 tablet (81 mg) by mouth 2 times daily   Quantity:  60 tablet   Refills:  0       HYDROmorphone 2 MG tablet   Commonly known as:  DILAUDID        Dose:  2-4 mg   Take 1-2 tablets (2-4 mg) by mouth every 4 hours as needed for moderate to severe pain   Quantity:  60 tablet   Refills:  0       senna-docusate 8.6-50 MG per tablet   Commonly known as:  SENOKOT-S;PERICOLACE        Dose:  1-2 tablet   Take 1-2 tablets by mouth 2 times daily as needed for constipation   Quantity:  50 tablet   Refills:  0         CONTINUE these medicines which have NOT CHANGED        Dose / Directions    omeprazole 20 MG CR capsule   Commonly known as:  priLOSEC        daily   Refills:  0       PAXIL 10 MG tablet   Generic drug:  PARoxetine        Refills:  0       PROBIOTIC DAILY PO        Take by mouth daily   Refills:  0       traZODone 50 MG tablet   Commonly known as:  DESYREL        Dose:  50 mg   50 mg At Bedtime   Refills:  0         STOP taking     UNABLE TO FIND                Where to get your medicines      These medications were sent to [facility] [place], [place] - [address]  [address] [address]     Phone:  [phone]     acetaminophen 325 MG tablet    aspirin 81 MG EC tablet    senna-docusate 8.6-50 MG per tablet         Some of these will need a paper prescription and others can be bought over the counter. Ask your nurse if you have questions.     Bring a paper prescription for each of these medications     HYDROmorphone 2 MG tablet                Protect others around you: Learn how to safely use, store and throw away your medicines at www.disposemymeds.org.             Medication List: This is a list of all your medications and when to take them. Check marks below indicate your daily  home schedule. Keep this list as a reference.      Medications           Morning Afternoon Evening Bedtime As Needed    acetaminophen 325 MG tablet   Commonly known as:  TYLENOL   Take 2 tablets (650 mg) by mouth every 4 hours as needed for other (surgical pain)   Start taking on:  10/13/2017   Last time this was given:  975 mg on 10/11/2017  5:04 AM                                aspirin 81 MG EC tablet   Take 1 tablet (81 mg) by mouth 2 times daily   Last time this was given:  81 mg on 10/11/2017  8:01 AM                                HYDROmorphone 2 MG tablet   Commonly known as:  DILAUDID   Take 1-2 tablets (2-4 mg) by mouth every 4 hours as needed for moderate to severe pain   Last time this was given:  2 mg on 10/11/2017  9:50 AM                                omeprazole 20 MG CR capsule   Commonly known as:  priLOSEC   daily   Last time this was given:  20 mg on 10/11/2017  6:30 AM                                PAXIL 10 MG tablet   Last time this was given:  10 mg on 10/10/2017  7:31 PM   Generic drug:  PARoxetine                                PROBIOTIC DAILY PO   Take by mouth daily                                senna-docusate 8.6-50 MG per tablet   Commonly known as:  SENOKOT-S;PERICOLACE   Take 1-2 tablets by mouth 2 times daily as needed for constipation   Last time this was given:  2 tablets on 10/11/2017  8:01 AM                                traZODone 50 MG tablet   Commonly known as:  DESYREL   50 mg At Bedtime   Last time this was given:  50 mg on 10/10/2017  9:22 PM

## 2017-10-10 NOTE — OR NURSING
PACU to Inpatient Nursing Handoff    Patient Mariana Royal is a 47 year old female who speaks English.   Procedure Procedure(s):  Left Partial Knee Replacement  - Wound Class: I-Clean   Surgeon(s) Primary: Israel Vogt MD  Assisting: Israel Sanches PA-C     No Known Allergies    Isolation  No active isolations    Past Medical History   has a past medical history of Anxiety and Arthritis. She also has no past medical history of Bleeding disorder (H); Cancer (H); Cerebral infarction (H); Chronic kidney disease; Degenerative joint disease; Diabetes (H); Heart disease; Hepatitis; Hypertension; PONV (postoperative nausea and vomiting); Surgical complication; or Uncomplicated asthma.    Anesthesia Spinal   Dermatome Level Dermatomes Left: L5  Dermatomes Right: L5   Preop Meds acetaminophen (Tylenol) - time given: 702  celecoxib (Celebrex) - time given: 701  gabapentin (Neurontin) - time given: 701   Nerve block Not applicable   Intraop Meds dexamethasone (Decadron)  ondansetron (Zofran): last given at 0943   Local Meds Yes - Local Cocktail (morphine, ropivacaine, epinephrine, Toradol) - time given: 0945   Antibiotics cefazolin (Ancef) - last given at 0805     Pain Patient Currently in Pain: denies  Comfort: negligible pain   PACU meds  Not applicable   PCA / epidural No   Capnography     Telemetry ECG Rhythm: Normal sinus rhythm      Labs Glucose Lab Results   Component Value Date    GLC 91 10/10/2017       Hgb Lab Results   Component Value Date    HGB 13.4 10/10/2017       INR Lab Results   Component Value Date    INR 1.01 10/10/2017      PACU Imaging Completed     Wound/Incision Incision/Surgical Site 06/07/16 Left Knee (Active)   Number of days:490       Incision/Surgical Site 10/10/17 Anterior;Left Knee (Active)   Incision Assessment WDL 10/10/2017 11:23 AM   Closure Staples;Liquid bandage 10/10/2017 10:15 AM   Incision Drainage Amount None 10/10/2017 11:23 AM   Dressing Intervention Clean,  dry, intact 10/10/2017 11:23 AM   Number of days:0      CMS LLE Temperature: warm  LLE Color: no discoloration  LLE Sensation: no numbness;no tingling  RLE Temperature: warm  RLE Color: no discoloration  RLE Sensation: no numbness;no tingling   Equipment ice pack   Other LDA       IV Access Peripheral IV 10/10/17 Left Hand (Active)   Site Assessment WDL 10/10/2017 11:00 AM   Line Status Infusing 10/10/2017 11:00 AM   Phlebitis Scale 0-->no symptoms 10/10/2017 11:00 AM   Infiltration Scale 0 10/10/2017 11:00 AM   Number of days:0      Blood Products Not applicable EBL surg log * No blood loss amount entered *   Intake/Output Date 10/10/17 0700 - 10/11/17 0659   Shift 1442-6374 4810-8767 5173-7211 24 Hour Total   I  N  T  A  K  E   P.O. 160   160    I.V. 1000   1000    Shift Total  (mL/kg) 1160  (15.2)   1160  (15.2)   O  U  T  P  U  T   Urine 1000   1000    Shift Total  (mL/kg) 1000  (13.11)   1000  (13.11)   Weight (kg) 76.3 76.3 76.3 76.3        Drains / Renteria     Time of void PreOp Void Prior to Procedure: 0612 (10/10/17 0651)    PostOp Voided (mL): 1000 mL (strasight cathed ) (10/10/17 1100)   Bladder Scan      mL (10/10/17 1100)  tolerating sips     Vitals    B/P: 109/55  T: 98  F (36.7  C)    Temp src: Oral  P:       Heart Rate: 79 (10/10/17 1115)     R: 18  O2:  SpO2: 98 %    O2 Device: None (Room air)              Family/support present yes   Patient belongings Patient Belongings: clothing;crutches;glasses;shoes (pt's spouse Je has pt's cell phone and keys. )   Patient transported on bed   DC meds/scripts (obs/outpt) Not applicable     Special needs/considerations None   Tasks needing completion None       Johana Segal RN  ASCOM [id number]

## 2017-10-10 NOTE — PLAN OF CARE
Problem: Knee Arthroplasty (Total, Partial) (Adult)  Goal: Signs and Symptoms of Listed Potential Problems Will be Absent, Minimized or Managed (Knee Arthroplasty)  Signs and symptoms of listed potential problems will be absent, minimized or managed by discharge/transition of care (reference Knee Arthroplasty (Total, Partial) (Adult) CPG).   Pt arrived to unit at 1200 and was oriented to room and call light.  VS:    VSS; on RA   Output:    Adequate output; first PVR 31. LBM 10/10.   Activity:    WBAT; 1A with FWW. Ambulated to BR; will sit in chair for supper.   Skin: Intact except for incision.   Pain:    Managed with 2mg PO dilaudid q3h   CMS:    Numbness to L foot d/t spinal   Dressing:    ACE CDI   Diet:    Tolerating regular diet   LDA:    PIV infusing   Equipment:    FWW, PCDs, capno   Plan:    Home upon DC   Additional Info:    Pt received spinal anesthesia + cocktail. .

## 2017-10-10 NOTE — ANESTHESIA PROCEDURE NOTES
Spinal/LP Procedure Note    Spinal Block  Staff:     Anesthesiologist:  DENISSE LING  Location: In OR BEFORE Induction  Procedure Start/Stop Times:     patient identified, IV checked, site marked, risks and benefits discussed, informed consent, monitors and equipment checked, pre-op evaluation, at physician/surgeon's request and post-op pain management      Correct Patient: Yes      Correct Position: Yes      Correct Site: Yes      Correct Procedure: Yes      Correct Laterality:  Yes    Site Marked:  Yes  Procedure:     Procedure:  Intrathecal    ASA:  2    Position:  Sitting    Sterile Prep: chloraprep      Insertion site:  L3-4    Approach:  Midline    Needle Type:  Audie    Needle gauge (G):  25    Local Skin Infiltration:  1% lidocaine    Needle Length (in):  3.5    Introducer used: Yes      Introducer gauge:  20 G    Attempts:  1    Redirects:  0    CSF:  Clear    Paresthesias:  No

## 2017-10-10 NOTE — OP NOTE
PREOPERATIVE DIAGNOSIS: Degenerative arthritis, Medial Compartment, Left knee.  POSTOPERATIVE DIAGNOSIS: Same as pre-op.  PROCEDURE: left medial Compartment Knee Replacement  DATE OF SURGERY: 10/10/2017   ASSISTANTS: NESTOR Looney    COMPONENTS USED:  1. ZUK (Smith/Nephew) Size C Femoral Component  2. ZUK (Smith/Nephew) Size 2 Tibial Component  3. ZUK (Smith/Nephew) Size 8 Polyethylene Insert    ANESTHESIA: Spinal  COMPLICATIONS: None  EBL: 100cc    FINDINGS: The medial compartment had severe degenerative changes with full thickness cartilage loss.  The remaining compartments had no degenerative changes and no evidence of cartilage wear.  The ACL, PCL, MCL and lateral stabilizing ligaments were all intact.  Following placement of the polyethylene liner the knee appeared to be in neutral alignment, have full extension, flexion to 130, good patellar tracking, and stable to to anterior and posterior stress in flexion and varus/valgus stress in extension, 30 degrees, and 90 degrees.  The wound was closed without tension.      INDICATIONS: Mariana Royal is a 47 year old female with longstanding history of left knee pain.  The patient was recently seen in clinic and found to have medial compartment arthritis.  The patient was followed and noted to have failed conservative treatment options. Radiographs and preoperative clinical symptoms are consistent with degenerative arthritis of the medial compartment as the cause of the patient's pain.  MRI was also obtained to confirm intact ACL, PCL, medial and lateral ligaments as well as maintained cartilage space of the PF and lateral compartments.   We discussed the risks, benefits, and alternatives of partial versus total knee arthroplasty with the patient.  Please see clinic note for full details of the preoperative discussion.  Following that the discussion, the patient elected to proceed with partial knee arthroplasty with possible TKA.    DESCRIPTION OF  PROCEDURE: We met the patient in the preoperative area.  There we again reviewed the risks, benefits, and alternatives to partial versus total knee arthroplasty.  Informed written consent was obtained.  The operative knee was marked with an indelible marker after patient confirmation of the operative site.  All the patient's questions were answered.  The patient was taken to the operating room and underwent induction of  Spinal anesthesia.  The patient was placed on the operating table in the supine position.  Bony prominences were well padded and he was secured to the table in the standard fashion.  A tourniquet was placed on the operative thigh and the patient was prepped and draped in the normal sterile fashion.       A timeout was performed in which the patient's name, MRN, imaging, preoperative plan and laterality was reviewed.  We also confirmed that the patient received the appropriate preoperative IV antibiotics and 1 gm of TXA.       The tourniquet was inflated.  We incised through the skin through a midline incision.  The knee capsule was identified and a medial parapatellar arthrotomy was made.  There was extensive clear synovial fluid.  The patellofemoral compartment was evaluated and no degenerative changes were present.  The lateral compartment was evaluated and no degenerative changes were present.  The ACL and PCL were intact as were the medial and lateral collateral ligaments.  We elected to proceed with UKA.    Medial synovium and patellar fat pad were removed as necessary to aid in visualization.  The proximal tibia was exposed and the extramedullary cutting guide was positioned.  The cutting guide was set at 2 mm and the guide was pinned into position.  The sagittal cut was made along the medial tibial spine, with care to preserve the ACL.  The tibial cut was made.  The tibial bone was removed.  The 8 mm spacer block fit appropriately with the appropriate amount of laxity and without over  correction of the varus alignment. The distal femoral cutting block was attached and the distal femoral cut was made.  The 8 mm spacer block fit well in flexion and in extension.  The knee was flexed and the size C femoral component fit best.  The component was lateralized and the rotation was set parallel to the tibial cut.  The component was secured and the lug holes were drilled.  The posterior and chamfer femoral cuts were made and the block was removed.    The tibia was measure and the 2 size tibial component fit best.  We trailed the three however there was an unacceptable amount of medial overhang and we could not remove additional lateral tibia as we were up against the ACL.  The size 2 trial tibial component was placed and secured with a pin.  The trial femoral component was placed.  The poly liner was placed.  The knee was tight in extension and flexion.  The trial components were removed and an additional 2 mm of tibial bone was resected.  The trials were placed.  At this point, the knee was examined and was noted to have improved alignment without over correction, full extension, flexion to 130 passively, stable to v/v stress in extension, 30 and 90 degrees of flexion, and the patella tracked smoothly.  The trial femur was removed.  The tibia was prepared with the lug holes.      The knee was lavaged and dried.  The components were opened and the cement was mixed.  The components were cemented into place with care to remove all posterior cement during placement of the tibial component.  A size 9 mm liner was placed.  While the cement was hardening the remaining periarticular injection was placed in the pericapsular soft tissues.  Dilute betadine was instilled in the knee for 3 minutes.  The knee was then lavaged.  Various poly sizes were trialed and the 8 mm trial fit best.  The final poly liner was placed.  Again, The knee was examined and was noted to have improved alignment without over correction,  full extension, flexion to 130 passively, stable to v/v stress in extension, 30 and 90 degrees of flexion, and the patella tracked smoothly.  The tourniquet was released at 59 minutes and hemostasis was obtained.  A second dose of TXA was given.    The knee was again copiously irrigated.  No drain was used.  Closure was performed with a #1 symmetric stratafix in the retinacular layer, 2-0 stratafix spiral in the  subcutaneous tissues, and monocryl in the skin.  A sterile dressing was applied.  The patient was then awakened, extubated, transferred to the [facility] and brought to the recovery room in stable condition. There were no complications.    POSTOPERATIVE PLAN:  Weight bearing: WBAT  Anticoagulation: ASA 81 mg BID x 4 weeks  Precautions: None  Pain control and monitoring  ID: Routine perioperative antibiotics      Israel Sanches acted as a trained surgical assistant and was a necessary part of the procedure due to the complexity of the operation.  No resident physician was available to assist.  The surgical assistant actively participated and was needed to help with soft tissue retraction, preparation and implantation of the components, and positioning of the limb during key steps of the procedure.  The assistant was necessary to allow for appropriate patient safety and successful completion of the case.

## 2017-10-10 NOTE — BRIEF OP NOTE
Orthopaedic Surgery Brief Op-Note      Patient: Mariana Royal  : 1970  Date of Service: 10/10/2017 10:03 AM    Pre-operative Diagnosis: Osteoarthritis  Post-operative Diagnosis: same    Procedure(s) Performed: Procedure(s):  Left Partial Knee Replacement  - Wound Class: I-Clean    Staff: Dr. Vogt  Assistants:   Israel Sanches PA-C    Anesthesia: General  EBL: 100 cc  Tourniquet Time: 59 minutes at 250 mmHg    Implants:     Implant Name Type Inv. Item Serial No.  Lot No. LRB No. Used   BONE CEMENT SIMPLEX W/TOBRAMYCIN 6197-9-001 Cement, Bone BONE CEMENT SIMPLEX W/TOBRAMYCIN 6197-9-001  [employer] [id number] Left 1   IMP INSERT ARTICULAR SURFACE TIP SS -005-01 Total Joint Component/Insert IMP INSERT ARTICULAR SURFACE TIP SS -654  [place]  [id number] Left 1   IMP COMP FEMORAL [id number] LT MEDIAL -598- Total Joint Component/Insert IMP COMP FEMORAL [id number] LT MEDIAL -870  [employer] [id number] Left 1   IMP COMP TIBIAL PLATE ZIM [id number] SZ2 LT -735 Total Joint Component/Insert IMP COMP TIBIAL PLATE ZIM [id number] SZ2  -239  [employer] [id number] Left 1   ARTICULAR SURFACE, SIZE 2, 8 MM HEIGHT       MY [id number] Left 1     Drains: none  Intra-op Labs/Cxs/Specimens: None  Complications: No apparent complications during procedure  Findings: Please see dictated operative note for details    Disposition: Stable to PACU, then admit to Orthopaedics.    Post-Op Plan:  Assessment/Plan: Mariana Royal is a 47 year old female s/p Procedure(s):  Left Partial Knee Replacement  - Wound Class: I-Clean on 10/10/2017 with Dr. Vogt.    Activity: Up with assist and assistive devices as needed until independent. Knee ROM as tolerated.   Weight bearing status: WBAT    Antibiotics: Cefazolin x 24 hours   Diet: Begin with clear fluids and progress diet as tolerated. Bowel regimen. Anti-emetics PRN.    DVT prophylaxis:   Mechanical while in hospital with aspirin 81mg BID x 4 weeks  Elevation: Elevate heels off of bed on pillows, no pillows behind the knee at any time    Wound Care: Aquacel x 5-7 days    Pain management: Orals PRN, IV for breakthrough only  X-rays: AP/Lat L knee XR in PACU.  Physical Therapy: Mobilization, ROM, ADL's  Occupational Therapy: ADL's  Labs: Trend Hgb on PODs #1 & 2  Cultures: None  Consults: PT, OT    Disposition: Pending progress with therapies, pain control on orals, and medical stability, anticipate discharge to Home on POD #1-2.    I assisted with exposure throughout the case, implant insertion, and closure. There was no resident learner/fellow available to assist with the case.      Israel Sanches PA-C 10/10/2017 10:03 AM  Orthopaedic Surgery     Please page me at [phone] with any questions/concerns during regular weekday hours before 5pm. If there is no response, if it is a weekend, or if it is during evening hours then please page the orthopaedic surgery resident on call.

## 2017-10-11 ENCOUNTER — APPOINTMENT (OUTPATIENT)
Dept: PHYSICAL THERAPY | Facility: CLINIC | Age: 47
End: 2017-10-11
Attending: ORTHOPAEDIC SURGERY
Payer: COMMERCIAL

## 2017-10-11 VITALS
WEIGHT: 168.21 LBS | BODY MASS INDEX: 26.4 KG/M2 | HEART RATE: 63 BPM | HEIGHT: 67 IN | RESPIRATION RATE: 16 BRPM | TEMPERATURE: 98.8 F | SYSTOLIC BLOOD PRESSURE: 103 MMHG | DIASTOLIC BLOOD PRESSURE: 52 MMHG | OXYGEN SATURATION: 100 %

## 2017-10-11 PROBLEM — Z96.651 S/P RIGHT UNICOMPARTMENTAL KNEE REPLACEMENT: Status: ACTIVE | Noted: 2017-10-11

## 2017-10-11 LAB
GLUCOSE SERPL-MCNC: 93 MG/DL (ref 70–99)
HGB BLD-MCNC: 12 G/DL (ref 11.7–15.7)

## 2017-10-11 PROCEDURE — 25000128 H RX IP 250 OP 636: Performed by: ORTHOPAEDIC SURGERY

## 2017-10-11 PROCEDURE — 90686 IIV4 VACC NO PRSV 0.5 ML IM: CPT | Performed by: ORTHOPAEDIC SURGERY

## 2017-10-11 PROCEDURE — 97116 GAIT TRAINING THERAPY: CPT | Mod: GP | Performed by: PHYSICAL THERAPIST

## 2017-10-11 PROCEDURE — 97110 THERAPEUTIC EXERCISES: CPT | Mod: GP | Performed by: PHYSICAL THERAPIST

## 2017-10-11 PROCEDURE — 36415 COLL VENOUS BLD VENIPUNCTURE: CPT | Performed by: PHYSICIAN ASSISTANT

## 2017-10-11 PROCEDURE — 40000193 ZZH STATISTIC PT WARD VISIT: Performed by: PHYSICAL THERAPIST

## 2017-10-11 PROCEDURE — 85018 HEMOGLOBIN: CPT | Performed by: PHYSICIAN ASSISTANT

## 2017-10-11 PROCEDURE — G0378 HOSPITAL OBSERVATION PER HR: HCPCS

## 2017-10-11 PROCEDURE — 82947 ASSAY GLUCOSE BLOOD QUANT: CPT | Performed by: PHYSICIAN ASSISTANT

## 2017-10-11 PROCEDURE — 25000132 ZZH RX MED GY IP 250 OP 250 PS 637: Performed by: PHYSICIAN ASSISTANT

## 2017-10-11 PROCEDURE — 97161 PT EVAL LOW COMPLEX 20 MIN: CPT | Mod: GP | Performed by: PHYSICAL THERAPIST

## 2017-10-11 PROCEDURE — 25000132 ZZH RX MED GY IP 250 OP 250 PS 637: Performed by: NURSE PRACTITIONER

## 2017-10-11 RX ORDER — ACETAMINOPHEN 325 MG/1
650 TABLET ORAL EVERY 4 HOURS PRN
Qty: 100 TABLET | Refills: 0 | Status: SHIPPED | OUTPATIENT
Start: 2017-10-13

## 2017-10-11 RX ORDER — AMOXICILLIN 250 MG
1-2 CAPSULE ORAL 2 TIMES DAILY PRN
Qty: 50 TABLET | Refills: 0 | Status: SHIPPED | OUTPATIENT
Start: 2017-10-11 | End: 2017-11-15

## 2017-10-11 RX ORDER — HYDROMORPHONE HYDROCHLORIDE 2 MG/1
2-4 TABLET ORAL EVERY 4 HOURS PRN
Qty: 60 TABLET | Refills: 0 | Status: SHIPPED | OUTPATIENT
Start: 2017-10-11 | End: 2017-10-16

## 2017-10-11 RX ADMIN — HYDROMORPHONE HYDROCHLORIDE 2 MG: 2 TABLET ORAL at 02:09

## 2017-10-11 RX ADMIN — HYDROMORPHONE HYDROCHLORIDE 2 MG: 2 TABLET ORAL at 13:49

## 2017-10-11 RX ADMIN — OMEPRAZOLE 20 MG: 20 CAPSULE, DELAYED RELEASE ORAL at 06:30

## 2017-10-11 RX ADMIN — HYDROMORPHONE HYDROCHLORIDE 2 MG: 2 TABLET ORAL at 05:05

## 2017-10-11 RX ADMIN — ACETAMINOPHEN 975 MG: 325 TABLET, FILM COATED ORAL at 05:04

## 2017-10-11 RX ADMIN — ASPIRIN 81 MG: 81 TABLET, COATED ORAL at 08:01

## 2017-10-11 RX ADMIN — ACETAMINOPHEN 975 MG: 325 TABLET, FILM COATED ORAL at 13:49

## 2017-10-11 RX ADMIN — SENNOSIDES AND DOCUSATE SODIUM 2 TABLET: 8.6; 5 TABLET ORAL at 08:01

## 2017-10-11 RX ADMIN — HYDROMORPHONE HYDROCHLORIDE 2 MG: 2 TABLET ORAL at 09:50

## 2017-10-11 RX ADMIN — INFLUENZA A VIRUS A/MICHIGAN/45/2015 X-275 (H1N1) ANTIGEN (FORMALDEHYDE INACTIVATED), INFLUENZA A VIRUS A/HONG KONG/4801/2014 X-263B (H3N2) ANTIGEN (FORMALDEHYDE INACTIVATED), INFLUENZA B VIRUS B/PHUKET/3073/2013 ANTIGEN (FORMALDEHYDE INACTIVATED), AND INFLUENZA B VIRUS B/BRISBANE/60/2008 ANTIGEN (FORMALDEHYDE INACTIVATED) 0.5 ML: 15; 15; 15; 15 INJECTION, SUSPENSION INTRAMUSCULAR at 10:58

## 2017-10-11 NOTE — PLAN OF CARE
Problem: Knee Arthroplasty (Total, Partial) (Adult)  Goal: Signs and Symptoms of Listed Potential Problems Will be Absent, Minimized or Managed (Knee Arthroplasty)  Signs and symptoms of listed potential problems will be absent, minimized or managed by discharge/transition of care (reference Knee Arthroplasty (Total, Partial) (Adult) CPG).   Outcome: Improving  Patient slept on and off throughout the night. AxO. VSS with soft BP's. Moonlighter notified, no new orders. Patient denies any chest pain or SOB. Patient reports numbness and tingling in LLE. Dressing is CDI and pedal pulses strong. CMS and neuros intact. CAPNO in place and maintaining sats of room air. Patient reports pain is well managed with prn dilaudid. Voiding without difficulty by ambulating to the bathroom with stand by assist. No Bm yet. Patient reports beginning of menstrual cycle, pad provided.  Patient was able to use call light to make needs known and call light remained within reach for the duration of the shift. Continue POC.

## 2017-10-11 NOTE — PROGRESS NOTES
10/11/17 0943   Quick Adds   Type of Visit Initial PT Evaluation       Present no   Language English   Living Environment   Lives With child(hetal), dependent;spouse   Living Arrangements house   Home Accessibility stairs (1 railing present);stairs to enter home;stairs within home   Number of Stairs to Enter Home 2  (no railings)   Number of Stairs Within Home 10   Stair Railings at Home inside, present on right side   Transportation Available car;family or friend will provide   Living Environment Comment Pt reported living in a 4 level home.    Self-Care   Usual Activity Tolerance moderate   Current Activity Tolerance fair   Regular Exercise no   Equipment Currently Used at Home crutches  (for past 5 months)   Activity/Exercise/Self-Care Comment Pt reported being independent with all ADLs at baseline.    Functional Level Prior   Ambulation 0-->independent   Transferring 0-->independent   Toileting 0-->independent   Bathing 0-->independent   Dressing 0-->independent   Eating 0-->independent   Communication 0-->understands/communicates without difficulty   Swallowing 0-->swallows foods/liquids without difficulty   Cognition 0 - no cognition issues reported   Fall history within last six months no   Which of the above functional risks had a recent onset or change? ambulation;transferring   Prior Functional Level Comment Pt reported using axillary crutches for past 5 months, limited due to pain.    General Information   Onset of Illness/Injury or Date of Surgery - Date 10/10/17   Referring Physician Israel Vogt MD   Patient/Family Goals Statement Pt would like to be able to walk all day without crutches and no pain.    Pertinent History of Current Problem (include personal factors and/or comorbidities that impact the POC) Pt is s/p left UKA, see chart for PMH.     Precautions/Limitations fall precautions  (no pillow under left knee )   Weight-Bearing Status - LUE full weight-bearing    Weight-Bearing Status - RUE full weight-bearing   Weight-Bearing Status - LLE weight-bearing as tolerated   Weight-Bearing Status - RLE full weight-bearing   General Observations Pt supine in bed at start of PT evaluation.   Cognitive Status Examination   Orientation other (see comments)  (Not tested)   Level of Consciousness alert   Follows Commands and Answers Questions 100% of the time;able to follow multistep instructions   Personal Safety and Judgment intact   Memory intact   Pain Assessment   Patient Currently in Pain Yes, see Vital Sign flowsheet   Integumentary/Edema   Integumentary/Edema other (describe)   Integumentary/Edema Comments Incision not observed secondary to post-op dressing in place.    Posture    Posture Forward head position;Protracted shoulders   Range of Motion (ROM)   ROM Comment Left knee ROM 0-5-90 degrees.  Pt demonstrated functional right LE ROM.    Strength   Strength Comments LE strength not formally tested.  Pt demonstrated functional right LE strength during bed mobility, transfers, and gait.     Bed Mobility   Bed Mobility Comments Supine to/from sitting with HOB elevated and mod I.     Transfer Skills   Transfer Comments Sit to/from standing with mod I and axillary crutches.  Pt demonstrated steady sit to/from standing transfers without axillary crutches, independent.     Gait   Gait Comments Pt ambulated 250' with axillary crutches and SBA to mod I.  Pt ascended/descended 3 steps with 2 crutches and SBA x 1.  Pt ascended/descended 3 steps with 1 railing, 1 crutch and SBA x 1.    Balance   Balance Comments Pt demonstrated good sitting balance at EOB.  Pt demonstrated good standing balance with axillary crutches.     Sensory Examination   Sensory Perception other (describe)   Sensory Perception Comments Pt has no c/o numbness/tingling along bilateral LEs.     General Therapy Interventions   Planned Therapy Interventions gait training;strengthening;ROM;home program guidelines  "  Intervention Comments Gait and LE strengthening/ROM initiated.    Clinical Impression   Criteria for Skilled Therapeutic Intervention yes, treatment indicated   PT Diagnosis Decreased LE strength/ROM and impaired functional mobilit.    Influenced by the following impairments Post-op pain, decreased ROM, s/p left UKA and decreased strength.    Functional limitations due to impairments Impaired transfers/gait   Clinical Presentation Stable/Uncomplicated   Clinical Presentation Rationale Pt is a 48 y/o female s/p left UKA.  PMH includes: arthritis and anxiety.    Clinical Decision Making (Complexity) Low complexity   Therapy Frequency` other (see comments)  (One time eval and treat )   Predicted Duration of Therapy Intervention (days/wks) 1 day   Anticipated Equipment Needs at Discharge other (see comments)  (No equipment needs)   Anticipated Discharge Disposition Home with Outpatient Therapy   Risk & Benefits of therapy have been explained Yes   Patient, Family & other staff in agreement with plan of care Yes   [facility] [employer] TM \"6 Clicks\"   2016, Trustees of [facility], under license to [employer].  All rights reserved.   6 Clicks Short Forms Basic Mobility Inpatient Short Form   [facility]  \"6 Clicks\" V.2 Basic Mobility Inpatient Short Form   1. Turning from your back to your side while in a flat bed without using bedrails? 4 - None   2. Moving from lying on your back to sitting on the side of a flat bed without using bedrails? 4 - None   3. Moving to and from a bed to a chair (including a wheelchair)? 4 - None   4. Standing up from a chair using your arms (e.g., wheelchair, or bedside chair)? 4 - None   5. To walk in hospital room? 4 - None   6. Climbing 3-5 steps with a railing? 4 - None   Basic Mobility Raw Score (Score out of 24.Lower scores equate to lower levels of function) 24   Total Evaluation Time   Total Evaluation Time (Minutes) 18     "

## 2017-10-11 NOTE — PLAN OF CARE
Problem: Patient Care Overview  Goal: Plan of Care/Patient Progress Review  OT:  Per PT, no OT needs.  Will complete orders.

## 2017-10-11 NOTE — PROGRESS NOTES
"Orthopedic Surgery Progress Note    Subjective / Interval Events:     Resting in bed in nad.  Denies sob or chest pain.  Pain well controlled on current regimen.      Objective:   Vital Signs Temp (24hrs), Av  F (36.7  C), Min:97.4  F (36.3  C), Max:98.8  F (37.1  C)    /52 (BP Location: Left arm)  Pulse 63  Temp 98.8  F (37.1  C) (Oral)  Resp 16  Ht 1.702 m (5' 7\")  Wt 76.3 kg (168 lb 3.4 oz)  LMP 2017 (Approximate)  SpO2 100%  BMI 26.35 kg/m2         PT:       Physical Examination   General: NAD  CV: RRR  Resp: Nonlabored breathing, breathing comfortably on ra  Wound: dressing in tact, c/d/i  LLE: SILT in dpn/spn/saph/han, able to flex and extend toes and ankle    Labs (Last 24 hour):   Lab Results   Component Value Date    WBC 5.6 10/10/2017    HGB 12.0 10/11/2017    HCT 39.5 10/10/2017    MCV 93 10/10/2017     10/10/2017    RDW 12.7 10/10/2017     Lab Results   Component Value Date    BUN 12 2009     2009    CO2 27 2009    ANIONGAP 8 2009       Medications:  [unfilled]  Assessment / Plan:   This is a 47 year old female POD# 1  s/p L medial unicompartmental knee arthroplasty.       NEURO: PO/IV pain medication, pain controlled  CV: Monitor HR/BP  Resp: Encourage IS, stable on ra  Heme/ID: stable, will cont monitoring of H/H  GI/: tolerating/voiding  MSK:WBAT, PT/OT  PPx: periop Ancef, DVT ppx: ASA     Dispo: pending PT eval, likely home today      Signed: Rian Elliott, 10/11/2017, 10:29 AM    "

## 2017-10-11 NOTE — PLAN OF CARE
Problem: Patient Care Overview  Goal: Plan of Care/Patient Progress Review  Discharge Planner PT   Patient plan for discharge: home with OP PT  Current status: Pt demonstrated mod I with bed mobility and sit to/from standing transfer with axillary crutches.  Sit to/from standing independently without axillary crutches.  Pt ambulated 250' with axillary crutches and SBA to mod I.  Pt ascended/descended 3 steps with bilateral axillary crutches and SBA x 1.  Pt ascended/descended 3 steps with 1 railing, axillary crutch and SBA x 1.  Pt knee ROM 0-5-90 degrees, pt verbalized and demonstrated understanding of TKA HEP.    Barriers to return to prior living situation: No barriers to discharge home.   Recommendations for discharge: Home with OP PT  Rationale for recommendations: Pt would benefit from OP PT for LE strengthening/ROM.       Entered by: Shirin Mathew 10/11/2017 10:30 AM         Physical Therapy Discharge Summary     Reason for therapy discharge:    Discharged to home with outpatient therapy.     Progress towards therapy goal(s). See goals on Care Plan in [facility] electronic health record for goal details.  Goals met     Therapy recommendation(s):    Continued therapy is recommended.  Rationale/Recommendations:  Pt would benefit from OP PT for LE strengthening/ROM. .

## 2017-10-11 NOTE — PROGRESS NOTES
Patient doing well overnight.   Pain well controlled - mild posterior knee pain.  Spinal completely worn off, but unable to ambulate last night 2/2 persistent spinal numbnes on bottom of foot.  Tolerating po    AOx3  NAD  Ehl/fhl/gs/at intact  [place]: dp/sp/t/s/s  Able to SLR  Foot well perfused.      Imaging: shows well positioned UKA implants.    A/P:  Doing well s/p UKA on left.  WBAT  ROM as tolerated  PT/OT  RTC 2 weeks  Required overnight monitoring 2/2 persistence of spinal and inability to ambulate with PT

## 2017-10-11 NOTE — PLAN OF CARE
Problem: Patient Care Overview  Goal: Individualization & Mutuality  Pt A/O X 4. Afebrile. VSS. Lungs- Clear bilaterally with both anterior and posterior. IS encouraged. Bowels- Hyperactive in all four quadrants. CMS and Neuro's are intact. Denies numbness and tingling in all extremities. Denies nausea, shortness of breath, and chest pain. Has pain in the left knee and given 2mg PO Dilaudid, ICE applied, and is tolerating well. Voids spontaneously without difficulty in the bathroom. Is on a Regular diet and appetite was Good this shift. Left knee incisional dressing is C/D/I. Pt up in room with SBA and a walker. PIV removed from the left hand for discharge. Bilateral heels are elevated off the bed. Pt is able to make needs known and the call light is within the pt's reach. Pt. discharged at 1430PM to home, was accompanied by her spouse, and left with personal belongings. Pt. received complete discharge paperwork and PO medications as filled by discharge pharmacy. Pt. was given times of last dose for all discharge medications in writing on discharge medication sheets. Discharge teaching included PO medication, pain management, activity restrictions, dressing changes, and signs and symptoms of infection. Dressing supplies sent home. Pt. to follow up with  in 2 weeks. Pt. had no further questions at the time of discharge and no unmet needs were identified.

## 2017-10-15 ENCOUNTER — HEALTH MAINTENANCE LETTER (OUTPATIENT)
Age: 47
End: 2017-10-15

## 2017-10-16 DIAGNOSIS — M62.838 MUSCLE SPASM: Primary | ICD-10-CM

## 2017-10-16 DIAGNOSIS — Z98.890 STATUS POST KNEE SURGERY: ICD-10-CM

## 2017-10-16 RX ORDER — HYDROXYZINE PAMOATE 25 MG/1
25-50 CAPSULE ORAL 4 TIMES DAILY PRN
Qty: 60 CAPSULE | Refills: 0 | Status: SHIPPED | OUTPATIENT
Start: 2017-10-16

## 2017-10-16 RX ORDER — HYDROMORPHONE HYDROCHLORIDE 2 MG/1
2-4 TABLET ORAL EVERY 4 HOURS PRN
Qty: 60 TABLET | Refills: 0 | Status: SHIPPED | OUTPATIENT
Start: 2017-10-16 | End: 2017-10-25

## 2017-10-16 NOTE — TELEPHONE ENCOUNTER
S/p left partial knee replacement on 10/10/17, taking 4mg Dilaudid every 4 hours with continued pain.  Last fill #60 on 10/10/17, pt calling for refill.  Hx headaches with oxycodone and did not feel well with Norco.  Vistaril was added to see if that helped.  New Dilaudid Rx mailed to pt's home per standing order.

## 2017-10-25 ENCOUNTER — OFFICE VISIT (OUTPATIENT)
Dept: ORTHOPEDICS | Facility: CLINIC | Age: 47
End: 2017-10-25

## 2017-10-25 VITALS — BODY MASS INDEX: 26.37 KG/M2 | HEIGHT: 67 IN | WEIGHT: 168 LBS

## 2017-10-25 DIAGNOSIS — Z98.890 STATUS POST KNEE SURGERY: ICD-10-CM

## 2017-10-25 RX ORDER — HYDROMORPHONE HYDROCHLORIDE 2 MG/1
2-4 TABLET ORAL EVERY 4 HOURS PRN
Qty: 40 TABLET | Refills: 0 | Status: SHIPPED | OUTPATIENT
Start: 2017-10-25 | End: 2017-11-15

## 2017-10-25 ASSESSMENT — ENCOUNTER SYMPTOMS
FEVER: 0
CHILLS: 0
POLYDIPSIA: 0
NIGHT SWEATS: 1
INCREASED ENERGY: 0
DECREASED APPETITE: 1
ALTERED TEMPERATURE REGULATION: 1
FATIGUE: 1
HALLUCINATIONS: 0
WEIGHT LOSS: 0
WEIGHT GAIN: 0
POLYPHAGIA: 0

## 2017-10-25 NOTE — PROGRESS NOTES
Interval History:   Mariana Royal is a 47 year old female who is here follow up for left medial compartment knee replacement on October 10 2017.    Since discharge from the hospital she feels like she is doing very well. She did have some night sweats, however these seem to be improving. She is not having any issues with her incision including no redness or drainage. Her pain is very well-controlled on the Dilaudid. She is weaning off the Dilaudid appropriately. She is progressing well with physical therapy and the therapist notes that her motion is about 0-110 .n  she feels like the medial sided knee symptoms that she had preoperatively are gone and that currently she has diffuse knee pain.           Physical Exam:     NAD  AOx3  Interactive and cooperative with the exam.  Ambulates with a well balanced gait.    The incision is clean, dry, and healing well  Motor: GS/AT are intact  Sensation: intact diffusely throughout the foot.  No pain with passive range of motion of the operative extremity.         Imaging:      No new imaging       Assessment and Plan:        She is making excellent progress following the left partial knee replacement. She can continue to progress with activities as tolerated. I encouraged her to continue working on range of motion. She'll let us know if she has any questions or concerns. Otherwise, she will return to clinic for a 6 week postoperative visit and we'll obtain the standard postoperative knee x-rays at that point.    Israel Vogt M.D.     Arthritis and Joint Replacement  Department of Orthopaedic Surgery, [facility]  [email or web address]  [phone] (pager)        Answers for HPI/ROS submitted by the patient on 10/25/2017   General Symptoms: Yes  Skin Symptoms: No  HENT Symptoms: No  EYE SYMPTOMS: No  HEART SYMPTOMS: No  LUNG SYMPTOMS: No  INTESTINAL SYMPTOMS: No  URINARY SYMPTOMS: No  GYNECOLOGIC SYMPTOMS: No  BREAST SYMPTOMS:  No  SKELETAL SYMPTOMS: No  BLOOD SYMPTOMS: No  NERVOUS SYSTEM SYMPTOMS: No  MENTAL HEALTH SYMPTOMS: No  Fever: No  Loss of appetite: Yes  Weight loss: No  Weight gain: No  Fatigue: Yes  Night sweats: Yes  Chills: No  Increased stress: No  Excessive hunger: No  Excessive thirst: No  Feeling hot or cold when others believe the temperature is normal: Yes  Loss of height: No  Post-operative complications: No  Surgical site pain: No  Hallucinations: No  Change in or Loss of Energy: No  Hyperactivity: No  Confusion: No

## 2017-10-25 NOTE — PROGRESS NOTES
Interval History:   47-year-old female who is here to follow-up on the following:  Left unicompartmental knee arthroplasty - 5/10/2017, Dr. Vogt    The patient is progressing well following her surgery. She reports her presurgical pain is improved. She continues to take 4-5 tabs of 2 mg Dilaudid daily. She is working with physical therapy team twice-weekly making good progress. She has not had any issues with her incision. She does report she has had night sweats pretty frequently since the time of her surgery which seem to be getting better the last 2 days. She reports she has not had issues with night sweats in the past. She denies a known fever, chills, or otherwise feeling unwell. 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            Physical Exam:     NAD  AOx3  Interactive and cooperative with the exam.  Ambulates with a well balanced gait    The incision is clean, dry, and  healing appropriately with small areas of eschar  Knee range of motion 0-108 degrees  Motor: GS/AT are intact  Sensation: intact diffusely throughout his foot.  Stable with varus and valgus stress as well as anterior and posterior drawer testing    foot is warm and well perfused        Imaging:      No new imaging.       Assessment and Plan:      2 weeks status post left UKA, recovering appropriately    She can continue to advance her activities as tolerated   Dilaudid refill prescron was given to patient today  FMLA and work paperwork was filled out to allow her to return back to work on November 20, 2017  Follow-up in 4 weeks with left knee x-rays at the time     Patient was seen and assessed with Dr. Vogt who agrees with the above assessment and plan    Germaine Thompson M.D.      Answers for HPI/ROS submitted by the patient on 10/25/2017   General Symptoms: Yes  Skin Symptoms: No  HENT Symptoms: No  EYE SYMPTOMS: No  HEART SYMPTOMS: No  LUNG SYMPTOMS: No  INTESTINAL SYMPTOMS: No  URINARY  SYMPTOMS: No  GYNECOLOGIC SYMPTOMS: No  BREAST SYMPTOMS: No  SKELETAL SYMPTOMS: No  BLOOD SYMPTOMS: No  NERVOUS SYSTEM SYMPTOMS: No  MENTAL HEALTH SYMPTOMS: No  Fever: No  Loss of appetite: Yes  Weight loss: No  Weight gain: No  Fatigue: Yes  Night sweats: Yes  Chills: No  Increased stress: No  Excessive hunger: No  Excessive thirst: No  Feeling hot or cold when others believe the temperature is normal: Yes  Loss of height: No  Post-operative complications: No  Surgical site pain: No  Hallucinations: No  Change in or Loss of Energy: No  Hyperactivity: No  Confusion: No

## 2017-10-25 NOTE — LETTER
10/25/2017       RE: Mariana Royal  [address] [place] [address]     Dear Colleague,    Thank you for referring your patient, Mariana Royal, to the [facility] at [facility]. Please see a copy of my visit note below.           Interval History:   47-year-old female who is here to follow-up on the following:  Left unicompartmental knee arthroplasty - 5/10/2017, Dr. Vogt    The patient is progressing well following her surgery. She reports her presurgical pain is improved. She continues to take 4-5 tabs of 2 mg Dilaudid daily. She is working with physical therapy team twice-weekly making good progress. She has not had any issues with her incision. She does report she has had night sweats pretty frequently since the time of her surgery which seem to be getting better the last 2 days. She reports she has not had issues with night sweats in the past. She denies a known fever, chills, or otherwise feeling unwell. 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            Physical Exam:     NAD  AOx3  Interactive and cooperative with the exam.  Ambulates with a well balanced gait    The incision is clean, dry, and  healing appropriately with small areas of eschar  Knee range of motion 0-108 degrees  Motor: GS/AT are intact  Sensation: intact diffusely throughout his foot.  Stable with varus and valgus stress as well as anterior and posterior drawer testing    foot is warm and well perfused        Imaging:      No new imaging.       Assessment and Plan:      2 weeks status post left UKA, recovering appropriately    She can continue to advance her activities as tolerated   Dilaudid refill prescron was given to patient today  FMLA and work paperwork was filled out to allow her to return back to work on November 20, 2017  Follow-up in 4 weeks with left knee x-rays at the time     Patient was seen and assessed with  Dr. Vogt who agrees with the above assessment and plan    Germaine Thompson M.D.      Answers for HPI/ROS submitted by the patient on 10/25/2017   General Symptoms: Yes  Skin Symptoms: No  HENT Symptoms: No  EYE SYMPTOMS: No  HEART SYMPTOMS: No  LUNG SYMPTOMS: No  INTESTINAL SYMPTOMS: No  URINARY SYMPTOMS: No  GYNECOLOGIC SYMPTOMS: No  BREAST SYMPTOMS: No  SKELETAL SYMPTOMS: No  BLOOD SYMPTOMS: No  NERVOUS SYSTEM SYMPTOMS: No  MENTAL HEALTH SYMPTOMS: No  Fever: No  Loss of appetite: Yes  Weight loss: No  Weight gain: No  Fatigue: Yes  Night sweats: Yes  Chills: No  Increased stress: No  Excessive hunger: No  Excessive thirst: No  Feeling hot or cold when others believe the temperature is normal: Yes  Loss of height: No  Post-operative complications: No  Surgical site pain: No  Hallucinations: No  Change in or Loss of Energy: No  Hyperactivity: No  Confusion: No             Interval History:   Mariana Royal is a 47 year old female who is here follow up for left medial compartment knee replacement on October 10 2017.    Since discharge from the hospital she feels like she is doing very well. She did have some night sweats, however these seem to be improving. She is not having any issues with her incision including no redness or drainage. Her pain is very well-controlled on the Dilaudid. She is weaning off the Dilaudid appropriately. She is progressing well with physical therapy and the therapist notes that her motion is about 0-110 .n  she feels like the medial sided knee symptoms that she had preoperatively are gone and that currently she has diffuse knee pain.           Physical Exam:     NAD  AOx3  Interactive and cooperative with the exam.  Ambulates with a well balanced gait.    The incision is clean, dry, and healing well  Motor: GS/AT are intact  Sensation: intact diffusely throughout the foot.  No pain with passive range of motion of the operative extremity.         Imaging:      No new imaging        Assessment and Plan:        She is making excellent progress following the left partial knee replacement. She can continue to progress with activities as tolerated. I encouraged her to continue working on range of motion. She'll let us know if she has any questions or concerns. Otherwise, she will return to clinic for a 6 week postoperative visit and we'll obtain the standard postoperative knee x-rays at that point.    Israel Vogt M.D.     Arthritis and Joint Replacement  Department of Orthopaedic Surgery, [facility]  [email or web address]  [phone] (pager)

## 2017-10-25 NOTE — MR AVS SNAPSHOT
After Visit Summary   10/25/2017    Mariana oRyal    MRN: [id number]           Patient Information     Date Of Birth          1970        Visit Information        Provider Department      10/25/2017 11:00 AM Israel Vogt MD [facility]        Today's Diagnoses     Status post knee surgery          Care Instructions    Follow up in 4 weeks with Dr. Vogt          Follow-ups after your visit        Follow-up notes from your care team     Return in about 4 weeks (around 2017).      Your next 10 appointments already scheduled     Nov 15, 2017  5:00 PM CST   (Arrive by 4:45 PM)   RETURN KNEE with Israel Vogt MD   [facility] ([facility])    [address] [place] [id number] [phone]              Who to contact     Please call your clinic at [phone] to:    Ask questions about your health    Make or cancel appointments    Discuss your medicines    Learn about your test results    Speak to your doctor   If you have compliments or concerns about an experience at your clinic, or if you wish to file a complaint, please contact [facility] Patient Relations at [phone] or email us at [email or web address]         Additional Information About Your Visit        MyChart Information     MobileAwaret is an electronic gateway that provides easy, online access to your medical records. With [facility], you can request a clinic appointment, read your test results, renew a prescription or communicate with your care team.     To sign up for Mobile[employer]waret visit the website at [email or web address]   You will be asked to enter the access code listed below, as well as some personal information. Please follow the directions to create your username and password.     Your access code is: [email or web address]  Expires: 2017  4:29 PM     Your access code will  in 90  "days. If you need help or a new code, please contact your [facility] or call [phone] for assistance.        Care EveryWhere ID     This is your Care EveryWhere ID. This could be used by other organizations to access your [facility] medical records  [id number]        Your Vitals Were     Height Last Period BMI (Body Mass Index)             1.702 m (5' 7\") 09/05/2017 (Approximate) 26.31 kg/m2          Blood Pressure from Last 3 Encounters:   10/11/17 103/52   06/16/16 91/61   06/07/16 100/55    Weight from Last 3 Encounters:   10/25/17 76.2 kg (168 lb)   10/10/17 76.3 kg (168 lb 3.4 oz)   09/06/17 74.8 kg (165 lb)              Today, you had the following     No orders found for display         Where to get your medicines      Some of these will need a paper prescription and others can be bought over the counter.  Ask your nurse if you have questions.     Bring a paper prescription for each of these medications     HYDROmorphone 2 MG tablet          Primary Care Provider Office Phone # Fax #    Eve Chahal [phone]       [facility] [address] [place] [address]        Equal Access to Services     AMBER TRIPP : Hadii alonso ku hadasho Soomaali, waaxda luqadaha, qaybta kaalmada adeegyada, mónica quevedo. So [facility] [phone].    ATENCIÓN: Si habla español, tiene a harrison disposición servicios gratuitos de asistencia lingüística. [facility] [phone].    We comply with applicable federal civil rights laws and [place] laws. We do not discriminate on the basis of race, color, national origin, age, disability, sex, sexual orientation, or gender identity.            Thank you!     Thank you for choosing [facility]  for your care. Our goal is always to provide you with excellent care. Hearing back from our patients is one way we can continue to improve our services. Please take a few minutes to complete the " written survey that you may receive in the mail after your visit with us. Thank you!             Your Updated Medication List - Protect others around you: Learn how to safely use, store and throw away your medicines at www.disposemBreakTheCrates.comeds.org.          This list is accurate as of: 10/25/17 11:50 AM.  Always use your most recent med list.                   Brand Name Dispense Instructions for use Diagnosis    acetaminophen 325 MG tablet    TYLENOL    100 tablet    Take 2 tablets (650 mg) by mouth every 4 hours as needed for other (surgical pain)    Status post knee surgery       aspirin 81 MG EC tablet     60 tablet    Take 1 tablet (81 mg) by mouth 2 times daily    Status post knee surgery       HYDROmorphone 2 MG tablet    DILAUDID    40 tablet    Take 1-2 tablets (2-4 mg) by mouth every 4 hours as needed for moderate to severe pain    Status post knee surgery       hydrOXYzine 25 MG capsule    VISTARIL    60 capsule    Take 1-2 capsules (25-50 mg) by mouth 4 times daily as needed (muscle spasms and pain)    Muscle spasm       omeprazole 20 MG CR capsule    priLOSEC     daily        PAXIL 10 MG tablet   Generic drug:  PARoxetine           PROBIOTIC DAILY PO      Take by mouth daily        senna-docusate 8.6-50 MG per tablet    SENOKOT-S;PERICOLACE    50 tablet    Take 1-2 tablets by mouth 2 times daily as needed for constipation    Status post knee surgery       traZODone 50 MG tablet    DESYREL     50 mg At Bedtime

## 2017-10-26 ENCOUNTER — DOCUMENTATION ONLY (OUTPATIENT)
Dept: ORTHOPEDICS | Facility: CLINIC | Age: 47
End: 2017-10-26

## 2017-10-27 ENCOUNTER — TELEPHONE (OUTPATIENT)
Dept: ORTHOPEDICS | Facility: CLINIC | Age: 47
End: 2017-10-27

## 2017-10-27 NOTE — TELEPHONE ENCOUNTER
DOS: 10/10/17 Left knee unicompartmental arthroplasty    Patient calls today stating that she noticed new swelling about her left ankle (the operative limb). She states this appeared last night and only decreased a small amount overnight. On questioning, patient states that the swelling is non-pitting. She does have some upper calf pain that has been present since after surgery and has not changed and is muscular. She does not report any redness, warmth, hard knots at the back of her calf. No shortness of breath or chest pain. She was not given JOSE ALBERTO hose on discharge. She does state that she has been more active in the last week with moving about and physical therapy. Advised patient to rest, ice and elevate today as much as she can. Patient lives in [place]; she does have a physical therapy appointment today and inquired if she should keep it. Advised patient to go to physical therapy and inform them of this change so they are aware. Cautioned patient that swelling worsens or fails to improve with ice and rest or if she develops any of the signs or symptoms we discussed of DVT, she should present to the ER for evaluation. Patient expressed understanding.

## 2017-10-30 NOTE — TELEPHONE ENCOUNTER
Mariana is s/p left uni knee replacement 20 days ago on 10/10/17.  She was phoned for an update.  She states the swelling in her left ankle has subsided, she has been going to P.T, doing well, flexion around 120 degrees.  She stated the swelling earlier may have been from overdoing it and with elevation and rest and ice, things are back to normal.  She has our number and will call if other questions or concerns arise.

## 2017-11-13 DIAGNOSIS — M25.562 LEFT KNEE PAIN: Primary | ICD-10-CM

## 2017-11-15 ENCOUNTER — OFFICE VISIT (OUTPATIENT)
Dept: ORTHOPEDICS | Facility: CLINIC | Age: 47
End: 2017-11-15

## 2017-11-15 VITALS — WEIGHT: 168 LBS | HEIGHT: 67 IN | BODY MASS INDEX: 26.37 KG/M2

## 2017-11-15 DIAGNOSIS — Z98.890 STATUS POST KNEE SURGERY: ICD-10-CM

## 2017-11-15 RX ORDER — HYDROMORPHONE HYDROCHLORIDE 2 MG/1
2-4 TABLET ORAL EVERY 4 HOURS PRN
Qty: 40 TABLET | Refills: 0 | Status: SHIPPED | OUTPATIENT
Start: 2017-11-15

## 2017-11-15 ASSESSMENT — ENCOUNTER SYMPTOMS
POLYDIPSIA: 0
HALLUCINATIONS: 0
ARTHRALGIAS: 1
POLYPHAGIA: 0
NIGHT SWEATS: 1
STIFFNESS: 1
WEIGHT GAIN: 0
FEVER: 0
MUSCLE WEAKNESS: 1
BACK PAIN: 0
JOINT SWELLING: 1
FATIGUE: 1
CHILLS: 0
WEIGHT LOSS: 0
NECK PAIN: 0
MYALGIAS: 0
INCREASED ENERGY: 0
MUSCLE CRAMPS: 1
DECREASED APPETITE: 0
ALTERED TEMPERATURE REGULATION: 0

## 2017-11-15 NOTE — NURSING NOTE
"Reason For Visit:   Chief Complaint   Patient presents with     Surgical Followup     DOS 10/10/17 S/P Left partial knee replacement       Primary MD: Eve Chahal 1.702 m (5' 7\")  Wt 76.2 kg (168 lb)  BMI 26.31 kg/m2      Current Outpatient Prescriptions   Medication     HYDROmorphone (DILAUDID) 2 MG tablet     hydrOXYzine (VISTARIL) 25 MG capsule     acetaminophen (TYLENOL) 325 MG tablet     PARoxetine (PAXIL) 10 MG tablet     omeprazole (PRILOSEC) 20 MG capsule     traZODone (DESYREL) 50 MG tablet     Probiotic Product (PROBIOTIC DAILY PO)     Current Facility-Administered Medications   Medication     triamcinolone acetonide (KENALOG-40) injection 40 mg        No Known Allergies        Lexy Li LPN  "

## 2017-11-15 NOTE — LETTER
11/15/2017       RE: Mariana Royal  [address] [place] [address]     Dear Colleague,    Thank you for referring your patient, Mariana Royal, to the [facility]IC at [facility]. Please see a copy of my visit note below.           Interval History:   47-year-old female who is here to follow-up on the following:  Left unicompartmental knee arthroplasty - 5/10/2017, Dr. Vogt    She has made good progress since last visit. At this point she is using one crutch for walking. She continues to take Dilaudid at nighttime to help her sleep. Otherwise she is not taking any pain medicine. She is progressing well with physical therapy. Her range of motion is noted to be about 0-123.  She notes a clicking sensation in her knee In full extension during ambulation. Otherwise she has no complaints.           Physical Exam:     NAD  AOx3  Interactive and cooperative with the exam.  Ambulates with a well balanced gait    Incision is well-healed  Knee range of motion 0-120 degrees  Motor: GS/AT are intact  Sensation: intact diffusely throughout his foot.  She hasn't palpable click when going from full extension to flexion this is nonpainful when seated. The kneecap tracks well.       Imaging:      Repeat imaging demonstrates well-positioned and well fixed partial knee replacement on the left side with slight varus alignment.       Assessment and Plan:      Mraiana is making good progress following her partial knee replacement. I would expect the foot sensation that she feels to continue to improve. I think this is likely some scar tissue within the knee. There is no evidence of issues with patellar maltracking. It is also no evidence that the implants are oversized. She'll continue to progress with activities as tolerated. She will return for a postoperative visit 2 months now she is having persistent issues. Otherwise, she will return for a one-year  postoperative visit.      Again, thank you for allowing me to participate in the care of your patient.      Sincerely,    Israel Vogt MD

## 2017-11-15 NOTE — MR AVS SNAPSHOT
"              After Visit Summary   11/15/2017    Mariana Royal    MRN: [id number]           Patient Information     Date Of Birth          1970        Visit Information        Provider Department      11/15/2017 5:00 PM Israel Vogt MD [facility]        Today's Diagnoses     Status post knee surgery           Follow-ups after your visit        Who to contact     Please call your clinic at [phone] to:    Ask questions about your health    Make or cancel appointments    Discuss your medicines    Learn about your test results    Speak to your doctor   If you have compliments or concerns about an experience at your clinic, or if you wish to file a complaint, please contact [facility] Patient Relations at [phone] or email us at [email or web address]         Additional Information About Your Visit        MyChart Information     E[employer] is an electronic gateway that provides easy, online access to your medical records. With E[employer], you can request a clinic appointment, read your test results, renew a prescription or communicate with your care team.     To sign up for [employer] visit the website at [email or web address]   You will be asked to enter the access code listed below, as well as some personal information. Please follow the directions to create your username and password.     Your access code is: [email or web address]  Expires: 2018  6:31 AM     Your access code will  in 90 days. If you need help or a new code, please contact your [facility] or call [phone] for assistance.        Care EveryWhere ID     This is your Care EveryWhere ID. This could be used by other organizations to access your [place] medical records  [id number]        Your Vitals Were     Height BMI (Body Mass Index)                1.702 m (5' 7\") 26.31 kg/m2           Blood Pressure from Last 3 Encounters:   10/11/17 " 103/52   06/16/16 91/61   06/07/16 100/55    Weight from Last 3 Encounters:   11/15/17 76.2 kg (168 lb)   10/25/17 76.2 kg (168 lb)   10/10/17 76.3 kg (168 lb 3.4 oz)              Today, you had the following     No orders found for display         Where to get your medicines      Some of these will need a paper prescription and others can be bought over the counter.  Ask your nurse if you have questions.     Bring a paper prescription for each of these medications     HYDROmorphone 2 MG tablet          Primary Care Provider Office Phone # Fax #    Eve Chahal [phone]       [facility] [address] [place] [address]        Equal Access to Services     [facility] JOSEE : Maricarmen Samayoa, waaxda luqadaha, qaybta kaalmada maricruzyaandres, mónica quevedo. So [facility] [phone].    ATENCIÓN: Si habla español, tiene a harrison disposición servicios gratuitos de asistencia lingüística. Llame al [phone].    We comply with applicable federal civil rights laws and [place] laws. We do not discriminate on the basis of race, color, national origin, age, disability, sex, sexual orientation, or gender identity.            Thank you!     Thank you for choosing [facility]  for your care. Our goal is always to provide you with excellent care. Hearing back from our patients is one way we can continue to improve our services. Please take a few minutes to complete the written survey that you may receive in the mail after your visit with us. Thank you!             Your Updated Medication List - Protect others around you: Learn how to safely use, store and throw away your medicines at www.disposemymeds.org.          This list is accurate as of: 11/15/17  6:37 PM.  Always use your most recent med list.                   Brand Name Dispense Instructions for use Diagnosis    acetaminophen 325 MG tablet    TYLENOL    100 tablet    Take 2 tablets (650 mg) by  mouth every 4 hours as needed for other (surgical pain)    Status post knee surgery       HYDROmorphone 2 MG tablet    DILAUDID    40 tablet    Take 1-2 tablets (2-4 mg) by mouth every 4 hours as needed for moderate to severe pain    Status post knee surgery       hydrOXYzine 25 MG capsule    VISTARIL    60 capsule    Take 1-2 capsules (25-50 mg) by mouth 4 times daily as needed (muscle spasms and pain)    Muscle spasm       omeprazole 20 MG CR capsule    priLOSEC     daily        PAXIL 10 MG tablet   Generic drug:  PARoxetine           PROBIOTIC DAILY PO      Take by mouth daily        traZODone 50 MG tablet    DESYREL     50 mg At Bedtime

## 2017-11-16 NOTE — PROGRESS NOTES
Interval History:   47-year-old female who is here to follow-up on the following:  Left unicompartmental knee arthroplasty - 5/10/2017, Dr. Vogt    She has made good progress since last visit. At this point she is using one crutch for walking. She continues to take Dilaudid at nighttime to help her sleep. Otherwise she is not taking any pain medicine. She is progressing well with physical therapy. Her range of motion is noted to be about 0-123.  She notes a clicking sensation in her knee In full extension during ambulation. Otherwise she has no complaints.           Physical Exam:     NAD  AOx3  Interactive and cooperative with the exam.  Ambulates with a well balanced gait    Incision is well-healed  Knee range of motion 0-120 degrees  Motor: GS/AT are intact  Sensation: intact diffusely throughout his foot.  She hasn't palpable click when going from full extension to flexion this is nonpainful when seated. The kneecap tracks well.       Imaging:      Repeat imaging demonstrates well-positioned and well fixed partial knee replacement on the left side with slight varus alignment.       Assessment and Plan:      Mariana is making good progress following her partial knee replacement. I would expect the foot sensation that she feels to continue to improve. I think this is likely some scar tissue within the knee. There is no evidence of issues with patellar maltracking. It is also no evidence that the implants are oversized. She'll continue to progress with activities as tolerated. She will return for a postoperative visit 2 months now she is having persistent issues. Otherwise, she will return for a one-year postoperative visit.    Answers for HPI/ROS submitted by the patient on 11/15/2017   General Symptoms: Yes  Skin Symptoms: No  HENT Symptoms: No  EYE SYMPTOMS: No  HEART SYMPTOMS: No  LUNG SYMPTOMS: No  INTESTINAL SYMPTOMS: No  URINARY SYMPTOMS: No  GYNECOLOGIC SYMPTOMS: No  BREAST SYMPTOMS: No  SKELETAL  SYMPTOMS: Yes  BLOOD SYMPTOMS: No  NERVOUS SYSTEM SYMPTOMS: No  MENTAL HEALTH SYMPTOMS: No  Fever: No  Loss of appetite: No  Weight loss: No  Weight gain: No  Fatigue: Yes  Night sweats: Yes  Chills: No  Increased stress: No  Excessive hunger: No  Excessive thirst: No  Feeling hot or cold when others believe the temperature is normal: No  Loss of height: No  Post-operative complications: No  Surgical site pain: Yes  Hallucinations: No  Change in or Loss of Energy: No  Hyperactivity: No  Confusion: No  Back pain: No  Muscle aches: No  Neck pain: No  Swollen joints: Yes  Joint pain: Yes  Bone pain: No  Muscle cramps: Yes  Muscle weakness: Yes  Joint stiffness: Yes  Bone fracture: No

## 2018-10-11 DIAGNOSIS — Z96.651 S/P RIGHT UNICOMPARTMENTAL KNEE REPLACEMENT: Primary | ICD-10-CM

## 2018-10-31 ENCOUNTER — OFFICE VISIT (OUTPATIENT)
Dept: ORTHOPEDICS | Facility: CLINIC | Age: 48
End: 2018-10-31
Payer: COMMERCIAL

## 2018-10-31 ENCOUNTER — RADIANT APPOINTMENT (OUTPATIENT)
Dept: GENERAL RADIOLOGY | Facility: CLINIC | Age: 48
End: 2018-10-31
Attending: ORTHOPAEDIC SURGERY
Payer: COMMERCIAL

## 2018-10-31 VITALS — WEIGHT: 168 LBS | BODY MASS INDEX: 26.37 KG/M2 | HEIGHT: 67 IN

## 2018-10-31 DIAGNOSIS — Z96.652 S/P LEFT UNICOMPARTMENTAL KNEE REPLACEMENT: ICD-10-CM

## 2018-10-31 DIAGNOSIS — Z96.651 S/P RIGHT UNICOMPARTMENTAL KNEE REPLACEMENT: ICD-10-CM

## 2018-10-31 DIAGNOSIS — Z96.651 S/P RIGHT UNICOMPARTMENTAL KNEE REPLACEMENT: Primary | ICD-10-CM

## 2018-10-31 NOTE — NURSING NOTE
"Reason For Visit:   Chief Complaint   Patient presents with     Surgical Followup     DOS 10/10/17 S/P LEft Partial knee replacement       Primary MD: Eve Chahal 1.702 m (5' 7\")  Wt 76.2 kg (168 lb)  BMI 26.31 kg/m2    Pain Assessment  Patient Currently in Pain: Denies    Current Outpatient Prescriptions   Medication     acetaminophen (TYLENOL) 325 MG tablet     HYDROmorphone (DILAUDID) 2 MG tablet     hydrOXYzine (VISTARIL) 25 MG capsule     omeprazole (PRILOSEC) 20 MG capsule     PARoxetine (PAXIL) 10 MG tablet     Probiotic Product (PROBIOTIC DAILY PO)     traZODone (DESYREL) 50 MG tablet     Current Facility-Administered Medications   Medication     triamcinolone acetonide (KENALOG-40) injection 40 mg        No Known Allergies        Lexy Li LPN    "

## 2018-10-31 NOTE — MR AVS SNAPSHOT
"              After Visit Summary   10/31/2018    Mariana Royal    MRN: [id number]           Patient Information     Date Of Birth          1970        Visit Information        Provider Department      10/31/2018 10:30 AM [facility]        Today's Diagnoses     S/P right unicompartmental knee replacement    -  1       Follow-ups after your visit        Who to contact     Please call your clinic at [phone] to:    Ask questions about your health    Make or cancel appointments    Discuss your medicines    Learn about your test results    Speak to your doctor            Additional Information About Your Visit        MyChart Information     [employer] is an electronic gateway that provides easy, online access to your medical records. With [employer], you can request a clinic appointment, read your test results, renew a prescription or communicate with your care team.     To sign up for [employer] visit the website at [email or web address]   You will be asked to enter the access code listed below, as well as some personal information. Please follow the directions to create your username and password.     Your access code is: [email or web address]  Expires: 1/15/2019  6:30 AM     Your access code will  in 90 days. If you need help or a new code, please contact your [facility] or call [phone] for assistance.        Care EveryWhere ID     This is your Care EveryWhere ID. This could be used by other organizations to access your [place] medical records  [id number]        Your Vitals Were     Height BMI (Body Mass Index)                1.702 m (5' 7\") 26.31 kg/m2           Blood Pressure from Last 3 Encounters:   10/11/17 103/52   16 91/61   16 100/55    Weight from Last 3 Encounters:   10/31/18 76.2 kg (168 lb)   11/15/17 76.2 kg (168 lb)   10/25/17 76.2 kg (168 lb)              Today, you had the following     No orders " found for display       Primary Care Provider Office Phone # Fax #    Eve Chahal [phone]       [facility] [address] [place] [address]        Equal Access to Services     [employer] : Hadii aad ku hadjuano Sojoe, waaxda luqadaha, qaybta kaalmada adericarda, mónica gates jillianecho bell lisa quevedo. So [facility] [phone].    ATENCIÓN: Si habla español, tiene a harrison disposición servicios gratuitos de asistencia lingüística. Llame al [phone].    We comply with applicable federal civil rights laws and [place] laws. We do not discriminate on the basis of race, color, national origin, age, disability, sex, sexual orientation, or gender identity.            Thank you!     Thank you for choosing [facility]  for your care. Our goal is always to provide you with excellent care. Hearing back from our patients is one way we can continue to improve our services. Please take a few minutes to complete the written survey that you may receive in the mail after your visit with us. Thank you!             Your Updated Medication List - Protect others around you: Learn how to safely use, store and throw away your medicines at www.disposemymeds.org.          This list is accurate as of 10/31/18 10:57 AM.  Always use your most recent med list.                   Brand Name Dispense Instructions for use Diagnosis    acetaminophen 325 MG tablet    TYLENOL    100 tablet    Take 2 tablets (650 mg) by mouth every 4 hours as needed for other (surgical pain)    Status post knee surgery       HYDROmorphone 2 MG tablet    DILAUDID    40 tablet    Take 1-2 tablets (2-4 mg) by mouth every 4 hours as needed for moderate to severe pain    Status post knee surgery       hydrOXYzine 25 MG capsule    VISTARIL    60 capsule    Take 1-2 capsules (25-50 mg) by mouth 4 times daily as needed (muscle spasms and pain)    Muscle spasm       omeprazole 20 MG CR capsule    priLOSEC     daily         PAXIL 10 MG tablet   Generic drug:  PARoxetine           PROBIOTIC DAILY PO      Take by mouth daily        traZODone 50 MG tablet    DESYREL     50 mg At Bedtime

## 2018-10-31 NOTE — PROGRESS NOTES
Interval History:   47-year-old female who is here to follow-up on the following:  Left unicompartmental knee arthroplasty - 5/10/2017  Mariana has been doing very well over the past year or so.  She has not had any problems with either her left or her right knee.  She is very happy with the outcome.  She is back to more or less all of her normal activities of daily living.  She has no knee pain.         Physical Exam:     NAD  AOx3  Interactive and cooperative with the exam.  Ambulates with a well balanced gait    Incision is well-healed  Knee range of motion 0-120 degrees  No pain with knee range of motion          Imaging:      Repeat imaging demonstrates well-positioned and well fixed partial knee replacement on the left side     Assessment and Plan:      Mariana has made excellent progress following the left partial knee replacement.  Her right partial knee replacement continues to function well.  We will continue to progress with activities as tolerated.  I will see her back in 2 or 3 years for repeat postoperative visit.  She will contact us if she has any questions or concerns in the meantime.  Answers for HPI/ROS submitted by the patient on 10/31/2018   General Symptoms: No  Skin Symptoms: No  HENT Symptoms: No  EYE SYMPTOMS: No  HEART SYMPTOMS: No  LUNG SYMPTOMS: No  INTESTINAL SYMPTOMS: No  URINARY SYMPTOMS: No  GYNECOLOGIC SYMPTOMS: No  BREAST SYMPTOMS: No  SKELETAL SYMPTOMS: No  BLOOD SYMPTOMS: No  NERVOUS SYSTEM SYMPTOMS: No  MENTAL HEALTH SYMPTOMS: No  PHQ-2 Score: 0

## 2018-10-31 NOTE — LETTER
10/31/2018       RE: Mariana Royal  [address] [place] [address]     Dear Colleague,    Thank you for referring your patient, Mariana Royal, to the [facility]. Please see a copy of my visit note below.           Interval History:   47-year-old female who is here to follow-up on the following:  Left unicompartmental knee arthroplasty - 5/10/2017  Mariana has been doing very well over the past year or so.  She has not had any problems with either her left or her right knee.  She is very happy with the outcome.  She is back to more or less all of her normal activities of daily living.  She has no knee pain.         Physical Exam:     NAD  AOx3  Interactive and cooperative with the exam.  Ambulates with a well balanced gait    Incision is well-healed  Knee range of motion 0-120 degrees  No pain with knee range of motion          Imaging:      Repeat imaging demonstrates well-positioned and well fixed partial knee replacement on the left side     Assessment and Plan:      Mariana has made excellent progress following the left partial knee replacement.  Her right partial knee replacement continues to function well.  We will continue to progress with activities as tolerated.  I will see her back in 2 or 3 years for repeat postoperative visit.  She will contact us if she has any questions or concerns in the meantime.    Israel Vogt MD

## 2020-08-24 ENCOUNTER — TELEPHONE (OUTPATIENT)
Dept: ORTHOPEDICS | Facility: CLINIC | Age: 50
End: 2020-08-24

## 2020-08-24 NOTE — TELEPHONE ENCOUNTER
[facility] Call Center    Phone Message    May a detailed message be left on voicemail: yes     Reason for Call: Symptoms or Concerns     If patient has red-flag symptoms, warm transfer to triage line    Current symptom or concern: Pt had surgery with Sin 3 yrs ago. Knee has been fine up until yesterday. It started hurting and knee is now swollen and painful. Pt has been icing it. No appts available until 10/21. Please call pt back to advise.     Symptoms have been present for:  yesterday day(s)    Has patient previously been seen for this? No    By : NA    Date: NA    Are there any new or worsening symptoms? No      Action Taken: Other:  ORTHO    Travel Screening: Not Applicable

## 2020-08-24 NOTE — TELEPHONE ENCOUNTER
Returned call to patient. Patient states she was cleaning her garage yesterday 8/23 and began to experience pain, limping and swelling. She rested, elevated and iced. Today she states it is not worse but has not improved. She was scheduled for a visit with Dr. Vogt on 10/21. Advised that patient continue to rest, ice and elevate for a couple of days. She should monitor for redness, warmth, drainage, fever. Moved her appointment up to 9/23 2:45. Advised patient that if she has worsening of her swelling or develops the other symptoms above before her appointment with Dr. Vogt, she should see her primary physician or orthopedist. Patient expressed understanding.

## 2020-09-09 DIAGNOSIS — Z96.651 S/P RIGHT UNICOMPARTMENTAL KNEE REPLACEMENT: Primary | ICD-10-CM

## 2020-09-23 ENCOUNTER — OFFICE VISIT (OUTPATIENT)
Dept: ORTHOPEDICS | Facility: CLINIC | Age: 50
End: 2020-09-23
Payer: COMMERCIAL

## 2020-09-23 ENCOUNTER — ANCILLARY PROCEDURE (OUTPATIENT)
Dept: GENERAL RADIOLOGY | Facility: CLINIC | Age: 50
End: 2020-09-23
Attending: ORTHOPAEDIC SURGERY
Payer: COMMERCIAL

## 2020-09-23 DIAGNOSIS — Z96.651 S/P RIGHT UNICOMPARTMENTAL KNEE REPLACEMENT: ICD-10-CM

## 2020-09-23 DIAGNOSIS — Z96.651 S/P RIGHT UNICOMPARTMENTAL KNEE REPLACEMENT: Primary | ICD-10-CM

## 2020-09-23 NOTE — LETTER
9/23/2020         RE: Mariana Royal  [address] [place] [address]        Dear Colleague,    Thank you for referring your patient, Mariana Royal, to the [facility]. Please see a copy of my visit note below.           Interval History:   47-year-old female who is here to follow-up on the following:  Left unicompartmental knee arthroplasty - 5/10/2017  Mariana had been doing very well with the left partial knee replacement.  About a month or so ago she had some increasing pain in the knee.  The pain was localized diffusely throughout the knee and was mildly swollen.  She rested for a few days.  Over the last couple weeks she feels like the symptoms have been steadily improving.  At this point she is more or less back to her normal level.  She has no pain or limp from the left side.  The right knee continues to do well.         Physical Exam:     NAD  AOx3  Interactive and cooperative with the exam.  Ambulates with a well balanced gait    Incision is well-healed  Knee range of motion 0-130 degrees  No pain with knee range of motion   The knee is stable to varus and valgus stress.  She has no proximal tibial or distal femoral tenderness palpation.  There is no specific point tenderness about her knee.             Imaging:      Repeat imaging demonstrates well-positioned and well fixed partial knee replacement on the left side     Assessment and Plan:      Mariana is doing well following the partial knee replacement.  I suspect this was likely a soft tissue related injury, given that it has been improving with time.  She will continue to progress activities as tolerated.  If her symptoms return she would let us know and I will see her back in clinic.  However based on the current symptoms and work-up I do not have any concern for implant related problems.  I will see her back for routine one-year follow-up visit.  She will let me know if she has any questions or  concerns in the meantime.    Again, thank you for allowing me to participate in the care of your patient.        Sincerely,        Israel Vogt MD

## 2020-09-29 NOTE — PROGRESS NOTES
Interval History:   47-year-old female who is here to follow-up on the following:  Left unicompartmental knee arthroplasty - 5/10/2017  Mariana had been doing very well with the left partial knee replacement.  About a month or so ago she had some increasing pain in the knee.  The pain was localized diffusely throughout the knee and was mildly swollen.  She rested for a few days.  Over the last couple weeks she feels like the symptoms have been steadily improving.  At this point she is more or less back to her normal level.  She has no pain or limp from the left side.  The right knee continues to do well.         Physical Exam:     NAD  AOx3  Interactive and cooperative with the exam.  Ambulates with a well balanced gait    Incision is well-healed  Knee range of motion 0-130 degrees  No pain with knee range of motion   The knee is stable to varus and valgus stress.  She has no proximal tibial or distal femoral tenderness palpation.  There is no specific point tenderness about her knee.             Imaging:      Repeat imaging demonstrates well-positioned and well fixed partial knee replacement on the left side     Assessment and Plan:      Mariana is doing well following the partial knee replacement.  I suspect this was likely a soft tissue related injury, given that it has been improving with time.  She will continue to progress activities as tolerated.  If her symptoms return she would let us know and I will see her back in clinic.  However based on the current symptoms and work-up I do not have any concern for implant related problems.  I will see her back for routine one-year follow-up visit.  She will let me know if she has any questions or concerns in the meantime.

## 2022-09-19 NOTE — Clinical Note
1/18/2017       RE: Mariana Royal  [address] [place] [address]     Dear Colleague,    Thank you for referring your patient, Mariana Royal, to the [facility] at [facility]. Please see a copy of my visit note below.    [facility], Orthopaedic Surgery, Arthritis, Hip and Knee Replacement    Mariana Royal MRN# [id number]   Age: 46 year old YOB: 1970     Requesting physician: Nathan Kelley*              History of Present Illness:   Mariana Royal is a 46 year old year old female who presents today for evaluation and management of right hip pain and left knee pain.    Right hip:   This pain started about 1 week ago.  The pain localizes to the lateral aspect of the hip.  She denies trauma.  The pain is present with ambulation.  She does not have pain with sitting, standing, or sleeping.  She does not have pain when walking backwards.  At this point she is using crutches for the bilateral leg pain.  She has tried ibuprofen which helps some.  No increased activity prior to the right hip pain.      Left knee:   She has had left knee pain for about 1 year.  The pain started around last Spring.  She was seen by Dr. Stewart and subsequently underwent left knee arthroscopy in 6/2017 with planned cartilage repair with [facility]CI.  Intraoperatively it was found that she was not a good candidate as the wear was worse than they had suspected.  At that surgery the meniscus and cartilage was debrided.  She has not had any injections.  She did injection on the right knee prior to the UKA in 2009 and did not have much relief.    The right UKA was done in 2009 and she has had a very good result from that surgery.      She works as an elementary .           Past Medical History:   There is no problem list on file for this patient.    Past Medical History   Diagnosis Date     Arthritis      Prior  "history of blood clot: none  Prior history of bleeding problems: none  Prior history of anesthetic complications: none  Currently on opioids:  none  History of Diabetes: no           Past Surgical History:     Past Surgical History   Procedure Laterality Date     Knee surgery       right x 5     Appendectomy       Mouth surgery       Arthroscopy knee Left 6/7/2016     Procedure: ARTHROSCOPY KNEE;  Surgeon: Nathan Stewart MD;  Location:  OR            Social History:     Social History     Social History     Marital Status:      Spouse Name: N/A     Number of Children: N/A     Years of Education: N/A     Occupational History     Not on file.     Social History Main Topics     Smoking status: Never Smoker      Smokeless tobacco: Not on file     Alcohol Use: Yes      Comment: social     Drug Use: No     Sexual Activity: Not on file     Other Topics Concern     Not on file     Social History Narrative     From [place].  Non smoker, elementary .           Family History:       Family History   Problem Relation Age of Onset     Other Cancer Mother      Hypertension Father      Other Cancer Father             Medications:     Current Outpatient Prescriptions   Medication Sig     PARoxetine (PAXIL) 10 MG tablet      omeprazole (PRILOSEC) 20 MG capsule daily      traZODone (DESYREL) 50 MG tablet 50 mg At Bedtime      Probiotic Product (PROBIOTIC DAILY PO) Take by mouth daily      UNABLE TO FIND MEDICATION NAME: Bio Cleanse - OTC     No current facility-administered medications for this visit.                Review of Systems:   A comprehensive 10 point review of systems (constitutional, ENT, cardiac, peripheral vascular, lymphatic, respiratory, GI, , Musculoskeletal, skin, Neurological) was performed and documented in the intake form and at the end of this note.           Physical Exam:     EXAMINATION pertinent findings:   VITAL SIGNS: Height 1.702 m (5' 7\"), weight 76.204 kg (168 " lb).  Body mass index is 26.31 kg/(m^2).  GEN: AOx3, appears stated age, cooperative, no distress  HEENT: normocephalic, atraumatic  RESP: non labored breathing   ABD: benign   SKIN: No rashes or open lesions   CV: Non-tachycardic   NEURO: CN2-12 grossly intact   VASCULAR: 2+ DP pulse   MUSCULOSKELETAL:   Gait: patient walks with a normal gait.   They do achieve full extension at the knee.    Left painful  Knee  Overall limb alignment is: Varus  Effusion or swelling of the knee: none  Tenderness to palpation: yes, pain localizes to the medial joint line  ROM: 0 to 135  Pain with knee ROM: none  Extensor lag: none  MCL stability: Stable corretable to neutral   Lateral Stability: Stable  Lachman: stable  Posterior stability: stable  Pain with passive full hip range of motion: none  Prior surgical incision: none      right painful  Hip  Trendelenberg sign: Negative  Pain with log roll: Negative  Pain with active straight leg raise: Negative  Tenderness to palpation over the bursa: Negative  Tenderness to palpation along the IT band: Negative  pain free passive straight leg raise  ROM  Extension 0, flexion 100, IR 30, ER 50, Abduction 40, Adduction 20  Prior surgical incision: none    No tenderness to palpation of the knee, no pain with knee range of motion.      Motor: normal ehl/fhl/gs/at strength  Sensory: normal sensation in DP/SP/T/S/S distributions  Vascular: 2+ PT pulse           Data:   Imaging:   Plain films show no hip OA, unremarkable pelvis xray.  Alignment films show mild varus alignment and medial compartment OA.           Assessment and Plan:   Assessment:  Ms. Royal is a very pleasant 46-year-old woman who is status post right unicompartmental knee arthroplasty in 2009.  She has had an excellent result from that surgery.      She presents today for evaluation of right hip and left knee pain.  With regards to the right hip, her current symptoms have been going on for about 1 week, and are consistent  with trochanteric bursitis.  There are no signs or symptoms consistent with osteoarthritis or other concerning findings.  At this point, I recommended physical therapy for hip abductor strengthening, as well as local modalities.  She was given a referral to physical therapy.      With regards to her left knee, she has known unicompartmental knee osteoarthritis, status post knee arthroscopy by Dr. Stewart in June.  We discussed operative and non-operative treatment options including corticosteroid injections, medications, and offloading knee brace.  At this point, her symptoms seem to be worsening.  Radiographically, she does not have quite bone-on-bone arthritis, but is very close, and based on Dr. Stewart's arthroscopic notes, it does seem like her arthritis is likely bone-on-bone at some sites.  She was interested in a corticosteroid injection today.  Please see procedure note.  She tolerated this well.  She was also given a referral for an offloading knee brace, which I think would greatly help with her symptoms.      She will return to clinic if she wishes for further treatment, including injections versus further discussion of surgical treatment options.           PROCEDURE DATE: 1/18/2017     PROCEDURE:  We discussed the risks and benefits of injection with the patient.  Informed verbal and writtten consent was obtained.  The patient's left knee was prepped in the normal sterile fashion.   The knee was injected with 9 cc of Lidocaine and 1 cc of Kenalog (40 mg/ml).       The patient tolerated the procedure well.        Israel Vogt M.D.     Arthritis and Joint Replacement  Department of Orthopaedic Surgery, [facility]  [email or web address]  [phone] (pager)               Hydroxyzine Pregnancy And Lactation Text: This medication is not safe during pregnancy and should not be taken. It is also excreted in breast milk and breast feeding isn't recommended.

## (undated) DEVICE — BLADE SAW SAGITTAL STRK 13X90X1.27MM HD SYS 6 6113-127-090

## (undated) DEVICE — SU STRATAFIX PDS PLUS 1 CT-1 18" SXPP1A404

## (undated) DEVICE — BASIN SET MAJOR

## (undated) DEVICE — EYE PREP BETADINE 5% SOLUTION 30ML 0065-0411-30

## (undated) DEVICE — GOWN XLG DISP 9545

## (undated) DEVICE — SOL NACL 0.9% IRRIG 3000ML BAG 2B7477

## (undated) DEVICE — SU STRATAFIXÂ PDO 2-0 24CMX24CM MH DA SXPD2B408

## (undated) DEVICE — HOOD FLYTE W/PEELAWAY 408-800-100

## (undated) DEVICE — Device

## (undated) DEVICE — DRSG AQUACEL AG 3.5X9.75" HYDROFIBER 412011

## (undated) DEVICE — BONE CEMENT MIXEVAC III HI VAC KIT  0206-015-000

## (undated) DEVICE — LINEN TOWEL PACK X5 5464

## (undated) DEVICE — BLADE KNIFE SURG 10 371110

## (undated) DEVICE — TOURNIQUET CUFF 30" REPRO BLUE 60-7070-105

## (undated) DEVICE — PREP CHLORAPREP 26ML TINTED ORANGE  260815

## (undated) DEVICE — NDL SPINAL 22GA 3.5" QUINCKE 405181

## (undated) DEVICE — SUCTION MANIFOLD DORNOCH ULTRA CART UL-CL500

## (undated) DEVICE — SU PDS II 0 CT-1 27" Z340H

## (undated) DEVICE — LINEN BACK PACK 5440

## (undated) DEVICE — DRAPE U-DRAPE 1015NSD NON-STERILE

## (undated) DEVICE — BONE CLEANING TIP INTERPULSE  0210-010-000

## (undated) DEVICE — ESU GROUND PAD ADULT W/CORD E7507

## (undated) DEVICE — STRAP KNEE/BODY 31143004

## (undated) DEVICE — SOL NACL 0.9% IRRIG 1000ML BOTTLE 2F7124

## (undated) DEVICE — GLOVE PROTEXIS POWDER FREE 8.0 ORTHOPEDIC 2D73ET80

## (undated) DEVICE — CAST PADDING 6" STERILE 9046S

## (undated) DEVICE — GLOVE PROTEXIS BLUE W/NEU-THERA 8.5  2D73EB85

## (undated) DEVICE — SU MONOCRYL 3-0 PS-1 27" Y936H

## (undated) DEVICE — SOL WATER IRRIG 1000ML BOTTLE 2F7114

## (undated) DEVICE — SUCTION IRR SYSTEM W/O TIP INTERPULSE HANDPIECE 0210-100-000

## (undated) RX ORDER — LIDOCAINE HYDROCHLORIDE 10 MG/ML
INJECTION, SOLUTION INFILTRATION; PERINEURAL
Status: DISPENSED
Start: 2017-05-03

## (undated) RX ORDER — FENTANYL CITRATE 50 UG/ML
INJECTION, SOLUTION INTRAMUSCULAR; INTRAVENOUS
Status: DISPENSED
Start: 2017-10-10

## (undated) RX ORDER — ONDANSETRON 2 MG/ML
INJECTION INTRAMUSCULAR; INTRAVENOUS
Status: DISPENSED
Start: 2017-10-10

## (undated) RX ORDER — TRAMADOL HYDROCHLORIDE 50 MG/1
TABLET ORAL
Status: DISPENSED
Start: 2017-10-10

## (undated) RX ORDER — ACETAMINOPHEN 500 MG
TABLET ORAL
Status: DISPENSED
Start: 2017-10-10

## (undated) RX ORDER — DEXAMETHASONE SODIUM PHOSPHATE 4 MG/ML
INJECTION, SOLUTION INTRA-ARTICULAR; INTRALESIONAL; INTRAMUSCULAR; INTRAVENOUS; SOFT TISSUE
Status: DISPENSED
Start: 2017-10-10

## (undated) RX ORDER — TRIAMCINOLONE ACETONIDE 40 MG/ML
INJECTION, SUSPENSION INTRA-ARTICULAR; INTRAMUSCULAR
Status: DISPENSED
Start: 2017-01-18

## (undated) RX ORDER — LIDOCAINE HYDROCHLORIDE 10 MG/ML
INJECTION, SOLUTION INFILTRATION; PERINEURAL
Status: DISPENSED
Start: 2017-01-18

## (undated) RX ORDER — TRIAMCINOLONE ACETONIDE 40 MG/ML
INJECTION, SUSPENSION INTRA-ARTICULAR; INTRAMUSCULAR
Status: DISPENSED
Start: 2017-05-03

## (undated) RX ORDER — PROPOFOL 10 MG/ML
INJECTION, EMULSION INTRAVENOUS
Status: DISPENSED
Start: 2017-10-10

## (undated) RX ORDER — PHENYLEPHRINE HCL IN 0.9% NACL 1 MG/10 ML
SYRINGE (ML) INTRAVENOUS
Status: DISPENSED
Start: 2017-10-10

## (undated) RX ORDER — GABAPENTIN 300 MG/1
CAPSULE ORAL
Status: DISPENSED
Start: 2017-10-10

## (undated) RX ORDER — CELECOXIB 200 MG/1
CAPSULE ORAL
Status: DISPENSED
Start: 2017-10-10

## (undated) RX ORDER — CEFAZOLIN SODIUM 2 G/100ML
INJECTION, SOLUTION INTRAVENOUS
Status: DISPENSED
Start: 2017-10-10